# Patient Record
Sex: MALE | Race: WHITE | Employment: FULL TIME | URBAN - METROPOLITAN AREA
[De-identification: names, ages, dates, MRNs, and addresses within clinical notes are randomized per-mention and may not be internally consistent; named-entity substitution may affect disease eponyms.]

---

## 2022-03-24 ENCOUNTER — HOSPITAL ENCOUNTER (OUTPATIENT)
Age: 52
Discharge: HOME | End: 2022-03-24
Payer: COMMERCIAL

## 2022-03-24 ENCOUNTER — OFFICE VISIT (OUTPATIENT)
Dept: FAMILY MEDICINE CLINIC | Age: 52
End: 2022-03-24
Payer: COMMERCIAL

## 2022-03-24 VITALS
BODY MASS INDEX: 37.94 KG/M2 | SYSTOLIC BLOOD PRESSURE: 110 MMHG | HEIGHT: 70 IN | WEIGHT: 265 LBS | OXYGEN SATURATION: 98 % | HEART RATE: 74 BPM | TEMPERATURE: 97.3 F | DIASTOLIC BLOOD PRESSURE: 70 MMHG

## 2022-03-24 DIAGNOSIS — R55 NEUROCARDIOGENIC SYNCOPE: ICD-10-CM

## 2022-03-24 DIAGNOSIS — G47.33 OSA ON CPAP: ICD-10-CM

## 2022-03-24 DIAGNOSIS — Z13.1 DIABETES MELLITUS SCREENING: ICD-10-CM

## 2022-03-24 DIAGNOSIS — R60.0 BILATERAL LEG EDEMA: ICD-10-CM

## 2022-03-24 DIAGNOSIS — Z99.89 OSA ON CPAP: ICD-10-CM

## 2022-03-24 DIAGNOSIS — R06.09 DOE (DYSPNEA ON EXERTION): ICD-10-CM

## 2022-03-24 DIAGNOSIS — Z11.59 ENCOUNTER FOR SCREENING FOR OTHER VIRAL DISEASES: ICD-10-CM

## 2022-03-24 DIAGNOSIS — Z12.5 PROSTATE CANCER SCREENING: ICD-10-CM

## 2022-03-24 DIAGNOSIS — Z13.6 SCREENING FOR CARDIOVASCULAR CONDITION: ICD-10-CM

## 2022-03-24 DIAGNOSIS — Z12.11 SCREEN FOR COLON CANCER: ICD-10-CM

## 2022-03-24 DIAGNOSIS — R53.82 CHRONIC FATIGUE: Primary | ICD-10-CM

## 2022-03-24 DIAGNOSIS — E66.01 SEVERE OBESITY (BMI 35.0-39.9) WITH COMORBIDITY (HCC): ICD-10-CM

## 2022-03-24 PROCEDURE — 99204 OFFICE O/P NEW MOD 45 MIN: CPT | Performed by: FAMILY MEDICINE

## 2022-03-24 PROCEDURE — 93005 ELECTROCARDIOGRAM TRACING: CPT

## 2022-03-24 ASSESSMENT — ENCOUNTER SYMPTOMS
ABDOMINAL DISTENTION: 0
WHEEZING: 0
COUGH: 0
CHEST TIGHTNESS: 0
SHORTNESS OF BREATH: 1
ABDOMINAL PAIN: 0
NAUSEA: 0
DIARRHEA: 0
VOMITING: 0
APNEA: 1
CONSTIPATION: 0

## 2022-03-24 ASSESSMENT — PATIENT HEALTH QUESTIONNAIRE - PHQ9
SUM OF ALL RESPONSES TO PHQ QUESTIONS 1-9: 0
2. FEELING DOWN, DEPRESSED OR HOPELESS: 0
SUM OF ALL RESPONSES TO PHQ QUESTIONS 1-9: 0
SUM OF ALL RESPONSES TO PHQ QUESTIONS 1-9: 0
1. LITTLE INTEREST OR PLEASURE IN DOING THINGS: 0
SUM OF ALL RESPONSES TO PHQ QUESTIONS 1-9: 0
SUM OF ALL RESPONSES TO PHQ9 QUESTIONS 1 & 2: 0

## 2022-03-24 NOTE — PATIENT INSTRUCTIONS
Patient Education     Compression stockings  Knee high  Medium strength  Pressure 20 mmHg       Low Sodium Diet (2,000 Milligram): Care Instructions  Overview     Limiting sodium can be an important part of managing some health problems. The most common source of sodium is salt. People get most of the salt in their diet from canned, prepared, and packaged foods. Fast food and restaurant meals also are very high in sodium. Your doctor will probably limit your sodium to less than 2,000 milligrams (mg) a day. This limit counts all the sodium in prepared and packaged foods and any salt you add to your food. Follow-up care is a key part of your treatment and safety. Be sure to make and go to all appointments, and call your doctor if you are having problems. It's also a good idea to know your test results and keep a list of the medicines you take. How can you care for yourself at home? Read food labels  · Read labels on cans and food packages. The labels tell you how much sodium is in each serving. Make sure that you look at the serving size. If you eat more than the serving size, you have eaten more sodium. · Food labels also tell you the Percent Daily Value for sodium. Choose products with low Percent Daily Values for sodium. · Be aware that sodium can come in forms other than salt, including monosodium glutamate (MSG), sodium citrate, and sodium bicarbonate (baking soda). MSG is often added to Asian food. When you eat out, you can sometimes ask for food without MSG or added salt. Buy low-sodium foods  · Buy foods that are labeled \"unsalted\" (no salt added), \"sodium-free\" (less than 5 mg of sodium per serving), or \"low-sodium\" (140 mg or less of sodium per serving). Foods labeled \"reduced-sodium\" and \"light sodium\" may still have too much sodium. Be sure to read the label to see how much sodium you are getting. · Buy fresh vegetables, or frozen vegetables without added sauces.  Buy low-sodium versions of canned vegetables, soups, and other canned goods. Prepare low-sodium meals  · Cut back on the amount of salt you use in cooking. This will help you adjust to the taste. Do not add salt after cooking. One teaspoon of salt has about 2,300 mg of sodium. · Take the salt shaker off the table. · Flavor your food with garlic, lemon juice, onion, vinegar, herbs, and spices. Do not use soy sauce, lite soy sauce, steak sauce, onion salt, garlic salt, celery salt, or ketchup on your food. · Use low-sodium salad dressings, sauces, and ketchup. Or make your own salad dressings and sauces without adding salt. · Use less salt (or none) when recipes call for it. You can often use half the salt a recipe calls for without losing flavor. Other foods such as rice, pasta, and grains do not need added salt. · Rinse canned vegetables, and cook them in fresh water. This removes somebut not allof the salt. · Avoid water that is naturally high in sodium or that has been treated with water softeners, which add sodium. If you buy bottled water, read the label and choose a sodium-free brand. Avoid high-sodium foods  · Avoid eating:  ? Smoked, cured, salted, and canned meat, fish, and poultry. ? Ham, hammer, hot dogs, and luncheon meats. ? Regular, hard, and processed cheese and regular peanut butter. ? Crackers with salted tops, and other salted snack foods such as pretzels, chips, and salted popcorn. ? Frozen prepared meals, unless labeled low-sodium. ? Canned and dried soups, broths, and bouillon, unless labeled sodium-free or low-sodium. ? Canned vegetables, unless labeled sodium-free or low-sodium. ? [place] fries, pizza, tacos, and other fast foods. ? Pickles, olives, ketchup, and other condiments, especially soy sauce, unless labeled sodium-free or low-sodium. Where can you learn more? Go to [email or web address] org and sign in to your [employer] account.  Enter W872 in the [facility] box to learn more about \"Low Sodium Diet (2,000 Milligram): Care Instructions. \"     If you do not have an account, please click on the \"Sign Up Now\" link. Current as of: September 8, 2021               Content Version: 13.1  © 2186-7904 Healthwise, Incorporated. Care instructions adapted under license by [facility] [address] If you have questions about a medical condition or this instruction, always ask your healthcare professional. [address] any warranty or liability for your use of this information.

## 2022-03-24 NOTE — PROGRESS NOTES
Visit Information    Have you changed or started any medications since your last visit including any over-the-counter medicines, vitamins, or herbal medicines? no   Are you having any side effects from any of your medications? -  no  Have you stopped taking any of your medications? Is so, why? -  no    Have you seen any other physician or provider since your last visit? No  Have you had any other diagnostic tests since your last visit? No  Have you been seen in the emergency room and/or had an admission to a hospital since we last saw you? No  Have you had your routine dental cleaning in the past 6 months? yes     Have you activated your [employer] account? If not, what are your barriers?  No     No care team member to display    Medical History Review  Past Medical, Family, and Social History reviewed and does contribute to the patient presenting condition    Health Maintenance   Topic Date Due    Hepatitis C screen  Never done    Depression Screen  Never done    HIV screen  Never done    DTaP/Tdap/Td vaccine (1 - Tdap) Never done    Diabetes screen  Never done    Lipid screen  Never done    Colorectal Cancer Screen  Never done    Shingles Vaccine (1 of 2) Never done    Flu vaccine (1) Never done    COVID-19 Vaccine  Completed    Hepatitis A vaccine  Aged Out    Hepatitis B vaccine  Aged Out    Hib vaccine  Aged Out    Meningococcal (ACWY) vaccine  Aged Out    Pneumococcal 0-64 years Vaccine  Aged Out

## 2022-03-24 NOTE — PROGRESS NOTES
Israel Reyes (:  1970) is a 49 y.o. male,New patient, here for evaluation of the following chief complaint(s): Established New Doctor, Leg Swelling (bilateral ankles at times ), Other (when he eats any beef it goes right through him lasts a couple of hours when eats it ), and Joint Pain      ASSESSMENT/PLAN:    1. Chronic fatigue  Failing to resolve  Will do basic labs to rule out certain common medical conditions: hematologic, renal, hepatic, electrolyte imbalances, thyroid disorders, vitamin D deficiency and testosterone deficiency/hypogonadism.    -     CBC; Future  -     Comprehensive Metabolic Panel; Future  -     TSH; Future  -     Vitamin D 25 Hydroxy; Future  -     Testosterone; Future  2. KING on CPAP  Improved with CPAP  However I believe his CPAP might need to be readjusted, discussed with patient, he will address it with his rheumatologist at the next appointment    3. DELGADO (dyspnea on exertion)  With exertion, failing to resolve  We will do an EKG to rule out cardiac disease  Chest x-ray in 2016 was normal  -     EKG 12 Lead; Future  4. Neurocardiogenic syncope  Lifelong since he was a child, when he sees needles  Advised deep breathing, to increase fluids, to lay down immediately with his legs elevated after having the blood draw, in order to prevent complete syncope  -     EKG 12 Lead; Future  5. Bilateral leg edema  Worsening  Advised compression stockings, advised to cut down on beer to no more than 1 drink at a time  We need to rule out cardiac disease, and liver disease, blood work ordered  -     EKG 12 Lead; Future  6. Severe obesity (BMI 35.0-39. 9) with comorbidity ([address]  Worsening  Wt Readings from Last 3 Encounters:   22 265 lb (120.2 kg)   16 240 lb (108.9 kg)     Low carb, low fat diet, increase fruits and vegetables, and exercise 4-5 times a week 30-40 minutes a day, or walk 1-2 hours per day, or wear a pedometer and get at least 10,000 steps per day.     7. Encounter for screening for other viral diseases  -     Hepatitis C Antibody; Future  8. Diabetes mellitus screening  -     Hemoglobin A1C; Future  9. Prostate cancer screening  -     PSA Screening; Future  The natural history of prostate cancer and ongoing controversy regarding screening and potential treatment outcomes of prostate cancer has been discussed with the patient. The meaning of a false positive PSA and a false negative PSA has been discussed. He indicates understanding of the limitations of this screening test and wishes to proceed with screening PSA testing. 10. Screening for cardiovascular condition  -     Lipid Panel; Future  11. Screen for colon cancer  -     Antonia James DO, General Surgery, [place] received counseling on the following healthy behaviors: nutrition, exercise, medication adherence and weight loss  Reviewed prior labs and health maintenance  Discussed use, benefit, and side effects of prescribed medications. Barriers to medication compliance addressed. Patient given educational materials - see patient instructions  All patient questions answered. Patient voiced understanding. The patient's past medical,surgical, social, and family history as well as his current medications and allergies were reviewed as documented in today's encounter. Medications, labs, diagnostic studies, consultations and follow-up as documented in this encounter. Return in about 3 months (around 6/24/2022) for Face-2F-30mins PHYSICAL, VISION screen, PHQ9. .    [facility] Payer Tuesday at 1 pm or Thursday at 8 am , if no openings for  face to face       Future Appointments   Date Time Provider Schuyler Phillip   6/30/2022  1:30 PM Obie Montiel MD fp sc [facility]           Prior to Visit Medications    Not on File       Allergies   Allergen Reactions    Vicodin [Hydrocodone-Acetaminophen] Nausea And Vomiting       History reviewed. No pertinent past medical history. History reviewed.  No pertinent surgical history. Family History   Problem Relation Age of Onset    High Blood Pressure Mother     High Blood Pressure Father     Diabetes Father     Cancer Sister        Social History     Tobacco Use    Smoking status: Never Smoker    Smokeless tobacco: Never Used   Substance Use Topics    Alcohol use: Yes     Alcohol/week: 1.0 standard drink     Types: 1 Cans of beer per week     Comment: 2-3 times per week    Drug use: Never         SUBJECTIVE/OBJECTIVE:    Prior PCP: Dr. Cj Madison many years ago  Patient says he has not seen  a doctor in 5 years    Patient admits to fatigue on and off for the past several months, goes to bed at 9 pm and wakes at 4 am.  Patient reports he does wake up tired in the morning. Patient admits to dyspnea on exertion with moderate to intense activities. He denies fever, chills, night sweats. He denies chest pains. Denies cold or heat intolerance, dry skin, constipation. Patient complains of ankles swelling for several years, progressively getting worse. Patient thinks that sitting a lot makes the leg swelling worse, leg swelling is worse in the evening. He does not wear compression stockings. He denies pain in his legs. He does admit that he drinks beer, every other day 1 beer at a time but over the weekend he drinks 3-4 beers x 3 days. Patient reports syncopal events when seeing needles, since he was a child  He describes Fainting, completely passing out, has syncope when seeing needles, \"everytime\". Passed out after each COVID vaccine. He reports it usually starts with Heart racing, and sweating before seeing the needles, like he is anticipating the event and completely passes out after the poke. Cannot eat beef for a few years. He says every time he eats beef, gets diarrhea, a lot of gas, and it is associated with bloating and cramps for 1-2 years.   Patient reports he has been absolutely avoiding beef and does not have diarrhea anymore. Sleep Apnea:  Current treatment: cPAP. Compliance: compliant all of the timeResidual symptoms include: morning fatigue and daytime fatigue. Patient sees Dr. Campos León every 6-12 months. Had right knee pain and swollen last year for 3 months, and his knee cap was floating inside of the knee, but now is resolved    Tested for HIV in 1996 and it was negative, he is  with the same partner, his wife      Patient is due for hepatitis C screening. Julito Ramos 's indication is CDC recommendation. He is due for diabetes screening  His weight has increased, he has gained 25 pounds in 6 years  Wt Readings from Last 3 Encounters:   03/24/22 265 lb (120.2 kg)   07/21/16 240 lb (108.9 kg)     He is due for PSA screening and he would like that, counseling given, and he agrees for screening. He would also like to have the testosterone checked. He denies difficulty urinating. Due for lipids screening. Due to age and obesity, Julito Ramos is due for lipids screening. Julito Ramos is not eating low fat diet. he is not exercising. he is not taking any over the counter supplements. Patient is due for colon cancer screening. Julito Ramos denies  nausea, vomiting, diarrhea, constipation, blood in the stool or abdominal pain. We discussed options, he would like to have: colonoscopy. [x]Negative depression screening. PHQ Scores 3/24/2022   PHQ2 Score 0   PHQ9 Score 0       Review of Systems   Constitutional: Positive for fatigue and unexpected weight change. Negative for activity change, appetite change, chills, diaphoresis and fever. Respiratory: Positive for apnea (on CPAP) and shortness of breath (DELGADO). Negative for cough, chest tightness and wheezing. Cardiovascular: Positive for leg swelling. Negative for chest pain and palpitations. Gastrointestinal: Negative for abdominal distention, abdominal pain, constipation, diarrhea, nausea and vomiting.    Endocrine: Negative for cold intolerance, heat intolerance, polydipsia, polyphagia and polyuria. Genitourinary: Negative for difficulty urinating, frequency and hematuria. Musculoskeletal: Negative for arthralgias. Skin: Positive for rash (legs). Hematological: Does not bruise/bleed easily. Psychiatric/Behavioral: Negative for dysphoric mood and sleep disturbance. The patient is not nervous/anxious.          -vital signs stable and within normal limits except severe obesity per BMI    /70   Pulse 74   Temp 97.3 °F (36.3 °C)   Ht 5' 10\" (1.778 m)   Wt 265 lb (120.2 kg)   SpO2 98%   BMI 38.02 kg/m²        Physical Exam  Vitals and nursing note reviewed. Constitutional:       General: He is not in acute distress. Appearance: Normal appearance. He is well-developed. He is obese. He is not diaphoretic. HENT:      Head: Normocephalic and atraumatic. Right Ear: External ear normal.      Left Ear: External ear normal.      Mouth/Throat:      Comments: I did not examine the mouth due to coronavirus pandemic and wearing masks. Eyes:      General: Lids are normal. No scleral icterus. Right eye: No discharge. Left eye: No discharge. Extraocular Movements: Extraocular movements intact. Conjunctiva/sclera: Conjunctivae normal.   Neck:      Thyroid: No thyromegaly. Comments: Thick neck  Cardiovascular:      Rate and Rhythm: Normal rate and regular rhythm. Heart sounds: Normal heart sounds. No murmur heard. Comments: nonsignificant superficial varicosities around the ankles noted  Pulmonary:      Effort: Pulmonary effort is normal. No respiratory distress. Breath sounds: Normal breath sounds. No wheezing or rales. Chest:      Chest wall: No tenderness. Abdominal:      General: Bowel sounds are normal. There is no distension. Palpations: Abdomen is soft. There is no hepatomegaly or splenomegaly. Tenderness: There is no abdominal tenderness.       Comments: Obese abdomen. Musculoskeletal:         General: No tenderness. Normal range of motion. Cervical back: Normal range of motion and neck supple. Right lower le+ Pitting Edema present. Left lower le+ Pitting Edema present. Lymphadenopathy:      Cervical: No cervical adenopathy. Skin:     General: Skin is warm and dry. Capillary Refill: Capillary refill takes less than 2 seconds. Findings: Rash present. Comments: Mild  stasis dermatitis on both legs   Neurological:      Mental Status: He is alert and oriented to person, place, and time. Deep Tendon Reflexes: Reflexes are normal and symmetric. Psychiatric:         Mood and Affect: Mood normal.         Behavior: Behavior normal.         Thought Content: Thought content normal.         Judgment: Judgment normal.           I personally reviewed testing with patient, and all questions answered. Mild hyperglycemia  Increased ALT likely fatty liver  Mildly low potassium  Otherwise labs within normal limits       Lab Results   Component Value Date    WBC 5.6 2016    HGB 14.3 2016    HCT 42.3 2016    MCV 88.8 2016     2016       Lab Results   Component Value Date     2016    K 3.7 2016     2016    CO2 27 2016    BUN 13 2016    CREATININE 1.32 2016    GLUCOSE 107 2016    CALCIUM 9.7 2016      No results found for: LABA1C      Lab Results   Component Value Date    ALT 64 (H) 2016    AST 32 2016    ALKPHOS 64 2016    BILITOT 0.33 2016       No results found for: TSHFT4, TSH    No results found for: CHOL  No results found for: TRIG  No results found for: HDL  No results found for: LDLCALC, LDLCHOLESTEROL  No results found for: CHOLHDLRATIO      No results found for: NDAZYDIM42  No results found for: FOLATE  No results found for: VITD25      No orders of the defined types were placed in this encounter.       Orders Placed This Encounter   Procedures    CBC     Standing Status:   Future     Standing Expiration Date:   3/24/2023    Comprehensive Metabolic Panel     Standing Status:   Future     Standing Expiration Date:   5/21/2022    Hemoglobin A1C     Standing Status:   Future     Standing Expiration Date:   3/24/2023    Hepatitis C Antibody     Standing Status:   Future     Standing Expiration Date:   3/24/2023    Lipid Panel     Standing Status:   Future     Standing Expiration Date:   3/24/2023     Order Specific Question:   Is Patient Fasting?/# of Hours     Answer:   8-10 Hours, water ok to drink    TSH     Standing Status:   Future     Standing Expiration Date:   3/24/2023    Vitamin D 25 Hydroxy     Standing Status:   Future     Standing Expiration Date:   3/24/2023    Testosterone     Standing Status:   Future     Standing Expiration Date:   3/24/2023    PSA Screening     Standing Status:   Future     Standing Expiration Date:   3/25/2023   [facility] DO [place], General Surgery, [place]     Referral Priority:   Routine     Referral Type:   Eval and Treat     Referral Reason:   Specialty Services Required     Referred to Provider:   Owen Llamas DO     Requested Specialty:   General Surgery     Number of Visits Requested:   1    EKG 12 Lead     Standing Status:   Future     Number of Occurrences:   1     Standing Expiration Date:   3/24/2023     Order Specific Question:   Reason for Exam?     Answer:   Shortness of Breath     Order Specific Question:   Reason for Exam?     Answer:   Syncope       There are no discontinued medications. On this date 3/24/2022 I have spent 45 minutes reviewing previous notes, test results and face to face with the patient discussing the diagnosis and importance of compliance with the treatment plan as well as documenting on the day of the visit and establish care.        This note was completed by using the assistance of a speech-recognition program. However, inadvertent computerized transcription errors may be present. Although every effort was made to ensure accuracy, no guarantees can be provided that every mistake has been identified and corrected by editing. An electronic signature was used to authenticate this note.   Electronically signed by Dante Willis MD on 3/28/2022 at 7:32 PM

## 2022-03-25 LAB
EKG ATRIAL RATE: 74 BPM
EKG P AXIS: 64 DEGREES
EKG P-R INTERVAL: 164 MS
EKG Q-T INTERVAL: 418 MS
EKG QRS DURATION: 104 MS
EKG QTC CALCULATION (BAZETT): 463 MS
EKG R AXIS: 1 DEGREES
EKG T AXIS: 53 DEGREES
EKG VENTRICULAR RATE: 74 BPM

## 2022-03-25 PROCEDURE — 93010 ELECTROCARDIOGRAM REPORT: CPT | Performed by: INTERNAL MEDICINE

## 2022-03-28 PROBLEM — R55 NEUROCARDIOGENIC SYNCOPE: Status: ACTIVE | Noted: 2022-03-28

## 2022-03-28 PROBLEM — E66.01 SEVERE OBESITY (BMI 35.0-39.9) WITH COMORBIDITY (HCC): Status: ACTIVE | Noted: 2022-03-28

## 2022-03-28 PROBLEM — R60.0 BILATERAL LEG EDEMA: Status: ACTIVE | Noted: 2022-03-28

## 2022-03-28 PROBLEM — R06.09 DOE (DYSPNEA ON EXERTION): Status: ACTIVE | Noted: 2022-03-28

## 2022-03-30 ENCOUNTER — TELEPHONE (OUTPATIENT)
Dept: SURGERY | Age: 52
End: 2022-03-30

## 2022-04-02 LAB
ALBUMIN SERPL-MCNC: 4.3 G/DL
ALP BLD-CCNC: 73 U/L
ALT SERPL-CCNC: 25 U/L
ANION GAP SERPL CALCULATED.3IONS-SCNC: 8 MMOL/L
ANTIBODY: REACTIVE
AST SERPL-CCNC: 16 U/L
BASOPHILS ABSOLUTE: NORMAL
BASOPHILS RELATIVE PERCENT: NORMAL
BILIRUB SERPL-MCNC: 0.5 MG/DL (ref 0.1–1.4)
BUN BLDV-MCNC: 17 MG/DL
CALCIUM SERPL-MCNC: 9.7 MG/DL
CHLORIDE BLD-SCNC: 105 MMOL/L
CHOLESTEROL, TOTAL: 173 MG/DL
CHOLESTEROL/HDL RATIO: 5.6
CO2: 28 MMOL/L
CREAT SERPL-MCNC: 1.58 MG/DL
EOSINOPHILS ABSOLUTE: NORMAL
EOSINOPHILS RELATIVE PERCENT: NORMAL
GFR CALCULATED: 47
GLUCOSE BLD-MCNC: 105 MG/DL
HCT VFR BLD CALC: 45.4 % (ref 41–53)
HDLC SERPL-MCNC: 31 MG/DL (ref 35–70)
HEMOGLOBIN: 15.6 G/DL (ref 13.5–17.5)
LDL CHOLESTEROL CALCULATED: 113 MG/DL (ref 0–160)
LYMPHOCYTES ABSOLUTE: NORMAL
LYMPHOCYTES RELATIVE PERCENT: NORMAL
MCH RBC QN AUTO: 30.7 PG
MCHC RBC AUTO-ENTMCNC: 34.4 G/DL
MCV RBC AUTO: 89 FL
MONOCYTES ABSOLUTE: NORMAL
MONOCYTES RELATIVE PERCENT: NORMAL
NEUTROPHILS ABSOLUTE: NORMAL
NEUTROPHILS RELATIVE PERCENT: NORMAL
NONHDLC SERPL-MCNC: ABNORMAL MG/DL
PLATELET # BLD: 216 K/ΜL
PMV BLD AUTO: 8.4 FL
POTASSIUM SERPL-SCNC: 4.1 MMOL/L
PROSTATE SPECIFIC ANTIGEN: 0.51 NG/ML
RBC # BLD: 5.09 10^6/ΜL
SODIUM BLD-SCNC: 141 MMOL/L
TESTOSTERONE TOTAL: 3.13
TOTAL PROTEIN: 6.9
TRIGL SERPL-MCNC: 145 MG/DL
TSH SERPL DL<=0.05 MIU/L-ACNC: 2.38 UIU/ML
VITAMIN D 25-HYDROXY: 31.4
VITAMIN D2, 25 HYDROXY: NORMAL
VITAMIN D3,25 HYDROXY: NORMAL
VLDLC SERPL CALC-MCNC: ABNORMAL MG/DL
WBC # BLD: 4.8 10^3/ML

## 2022-04-04 LAB
AVERAGE GLUCOSE: 128
HBA1C MFR BLD: 6.1 %

## 2022-04-06 ENCOUNTER — TELEPHONE (OUTPATIENT)
Dept: FAMILY MEDICINE CLINIC | Age: 52
End: 2022-04-06

## 2022-04-06 DIAGNOSIS — Z12.11 SCREEN FOR COLON CANCER: Primary | ICD-10-CM

## 2022-04-06 DIAGNOSIS — N18.31 STAGE 3A CHRONIC KIDNEY DISEASE (HCC): ICD-10-CM

## 2022-04-06 NOTE — TELEPHONE ENCOUNTER
Due to insurance I had to make a new referral, which most likely was given to the wife. The wife also asked about his blood work, which we have never received, by refreshing, I was able to get it    Please inform the patient regarding the following results    CBC within normal limits, no anemia  Comprehensive metabolic panel shows chronic kidney disease stage III moderate he is not to take ibuprofen, naproxen, Aleve, Motrin  He is not on any medications to cause this, however will do further work-up ultrasound of the kidneys and refer him to nephrologist    Blood glucose mildly high, hemoglobin A1c 6.1, prediabetes  PSA normal  Testosterone within normal limits  Vitamin D is borderline low could take vitamin D 2000 units daily with food  Thyroid function normal    Lipids mildly high, Low carb, low fat diet, increase fruits and vegetables, and exercise 4-5 times a week 30-40 minutes a day, or walk 1-2 hours per day, or wear a pedometer and get at least 10,000 steps per day. Negative for hepatitis C    PLEASE ENTER ALL RESULT UNDER MY PRIOR ORDERS AS [facility]      Please give him contact information, to schedule appointment, ultrasound kidney needs to be done in [facility]     Diagnosis Orders   1. Screen for colon cancer  [facility] - Lety Marquez MD, General Surgery, [place]   2.  Stage 3a chronic kidney disease ([address]  US RENAL COMPLETE    [facility]) - Gwendolyn Oliveira MD, Nephrology, [place]        Future Appointments   Date Time Provider Schuyler Phillip   6/30/2022  1:30 PM Lennox [place], MD fp sc [facility]

## 2022-04-06 NOTE — TELEPHONE ENCOUNTER
Please give wife the referral     Diagnosis Orders   1.  Screen for colon cancer  [facility] - Igro Manrique MD, General Surgery, [place]

## 2022-04-07 DIAGNOSIS — Z12.5 PROSTATE CANCER SCREENING: ICD-10-CM

## 2022-04-07 DIAGNOSIS — R53.82 CHRONIC FATIGUE: ICD-10-CM

## 2022-04-07 DIAGNOSIS — Z13.6 SCREENING FOR CARDIOVASCULAR CONDITION: ICD-10-CM

## 2022-04-07 DIAGNOSIS — Z11.59 ENCOUNTER FOR SCREENING FOR OTHER VIRAL DISEASES: ICD-10-CM

## 2022-04-07 NOTE — RESULT ENCOUNTER NOTE
Patient already informed by Danette Fuelling 10:11 AM  Note  Spoke with wife and went over all results. I faxed referrals to both specialists and faxed US to [facility] clinic.

## 2022-04-08 DIAGNOSIS — Z13.1 DIABETES MELLITUS SCREENING: ICD-10-CM

## 2022-04-08 NOTE — RESULT ENCOUNTER NOTE
Patient already notified regarding prediabetes via telephone encounter 4/6/2022    Low carb, low fat diet, increase fruits and vegetables, and exercise 4-5 times a week 30-40 minutes a day, or walk 1-2 hours per day, or wear a pedometer and get at least 10,000 steps per day.       Future Appointments  4/27/2022  11:00 AM   Bentley eTnorio MD   [facility]        [facility]  6/30/2022  1:30 PM    Charlotte Goldberg MD     fp sc               Rufus Rinne

## 2022-04-21 DIAGNOSIS — N18.31 STAGE 3A CHRONIC KIDNEY DISEASE (HCC): ICD-10-CM

## 2022-04-21 NOTE — RESULT ENCOUNTER NOTE
Please notify patient: Normal ultrasound of the kidneys and bladder    Future Appointments  4/27/2022  11:00 AM   Tello Winston MD   [facility]        [facility]  6/30/2022  1:30 PM    Vania Malone MD     fp isiah Kimble

## 2022-07-19 ENCOUNTER — TELEMEDICINE (OUTPATIENT)
Dept: FAMILY MEDICINE CLINIC | Age: 52
End: 2022-07-19
Payer: COMMERCIAL

## 2022-07-19 ENCOUNTER — TELEPHONE (OUTPATIENT)
Dept: FAMILY MEDICINE CLINIC | Age: 52
End: 2022-07-19

## 2022-07-19 DIAGNOSIS — G47.33 OSA ON CPAP: ICD-10-CM

## 2022-07-19 DIAGNOSIS — Z99.89 OSA ON CPAP: ICD-10-CM

## 2022-07-19 DIAGNOSIS — E66.01 SEVERE OBESITY (BMI 35.0-39.9) WITH COMORBIDITY (HCC): ICD-10-CM

## 2022-07-19 DIAGNOSIS — R53.82 CHRONIC FATIGUE: Primary | ICD-10-CM

## 2022-07-19 DIAGNOSIS — R73.03 PREDIABETES: ICD-10-CM

## 2022-07-19 DIAGNOSIS — N18.31 STAGE 3A CHRONIC KIDNEY DISEASE (HCC): ICD-10-CM

## 2022-07-19 DIAGNOSIS — E78.5 HYPERLIPIDEMIA WITH TARGET LDL LESS THAN 100: ICD-10-CM

## 2022-07-19 PROCEDURE — 99214 OFFICE O/P EST MOD 30 MIN: CPT | Performed by: FAMILY MEDICINE

## 2022-07-19 ASSESSMENT — ENCOUNTER SYMPTOMS
DIARRHEA: 0
VOMITING: 0
WHEEZING: 0
ABDOMINAL PAIN: 0
SHORTNESS OF BREATH: 0
NAUSEA: 0
ABDOMINAL DISTENTION: 0
CHEST TIGHTNESS: 0
COUGH: 0
CONSTIPATION: 0
APNEA: 1

## 2022-07-19 NOTE — PROGRESS NOTES
2022    TELEHEALTH EVALUATION -- Audio/Visual (During JOIHJ-04 public health emergency)      Angus Leyden (:  1970) is a 47 y.o. male,Established patient, here for evaluation of the following chief complaint(s): Fatigue, Results, and Health Maintenance        ASSESSMENT/PLAN:    1. Chronic fatigue  Stable, failing to improve  Patient says his fatigue is not improving since he found he has Chronic kidney disease stage 3  Has stopped NSAIDs and has been staying hydrated. Has labs coming up per nephrology  Advised to discuss with pulmonologist to have his CPAP verified    2. Stage 3a chronic kidney disease (HCC)  Likely improving  Work-up is negative for immunologic disorders, multiple myeloma, inflammatory disorders. I reviewed all the work-up that he has done in [facility], through the computer, all the questions answered to my best.  On 6/10/2022: Protein electrophoresis serum and urine within normal limits, complement profile within normal limits, ANCA within normal limits, HILARIO negative, UA normal.  PSA was also normal which rules out obstructive uropathy  Ultrasound of the kidneys normal on 2022, completed at [facility]    Patient will get blood work in 3 weeks for nephrologist  Increase fluids 8 x 8 ounce glasses of water every day  Only Tylenol, no NSAIDs, patient is already doing this      3. KING on CPAP  Improved with CPAP, patient benefits from using the CPAP  Compliance with CPAP discussed  Follows with Dr. Karl Ramsey for CPAP management      4. Prediabetes  Moderate severe, newly found  Lab Results   Component Value Date    LABA1C 6.1 2022   Low-carb diet to improve prediabetes, exercise, attempt to lose weight, we will check at the next appointment    5. Hyperlipidemia with target LDL less than 100  Ongoing  Lab Results   Component Value Date    LDLCALC 113 2022     Low-carb low-fat diet exercise to improve lipid recheck at the next appointment    6.  Severe obesity (BMI 35.0-39. 9) with comorbidity ([address]  worsening    Reports  lbs  Wt Readings from Last 3 Encounters:   22 262 lb 3.2 oz (118.9 kg)   22 265 lb (120.2 kg)   16 240 lb (108.9 kg)     Low carb, low fat diet, increase fruits and vegetables, and exercise 4-5 times a week 30-40 minutes a day, or walk 1-2 hours per day, or wear a pedometer and get at least 10,000 steps per day. I did advise him to get the tetanus shot and second COVID-19 booster at the pharmacy        Gurpreet De Luna received counseling on the following healthy behaviors: nutrition, exercise, medication adherence, and weight loss  Reviewed prior labs and health maintenance  Discussed use, benefit, and side effects of prescribed medications. Barriers to medication compliance addressed. Patient given educational materials - see patient instructions  All patient questions answered. Patient voiced understanding. The patient's past medical,surgical, social, and family history as well as his current medications and allergies were reviewed as documented in today's encounter. Medications, labs, diagnostic studies, consultations and follow-up as documented in this encounter. Return in about 5 months (around 2022) for Visit type PHYSICAL, VISION screen, PHQ9. ( A1c at the lab, fainting). Data Unavailable    Future Appointments   Date Time Provider Schuyler Phillip   2022  2:30 PM Marry Terry MD [facility]         SUBJECTIVE/OBJECTIVE:  Gabrielle Marsh (:  1970) has requested an audio/video evaluation for the following concern(s):Fatigue, Results, and Health Maintenance    Patient reports fatigue is the same, not improved. Recent testing found him to have chronic kidney disease stage III. Creatinine 1.58 on 2022, GFR 47.     This is the follow-up after the testing, we already referred him to a nephrologist and completed work-up for chronic kidney disease Although testing done in [facility] ordered by nephrologist, reviewed with patient, was negative for secondary causes of chronic kidney disease. Also ultrasound of the kidney was normal.    Will do labs in 3 weeks BMP, CBC, magnesium and Phosphorus, orders in the computer and he says he does have the papers at home, she does the blood work in [facility]. Patient reports since the last appointment he has stopped Aleve. Patient reports he feels he is drinking water, but not so much    Patient denies frequency of urination, urgency of urination, flank pain, blood in the urine. UA was normal.    Denies fever, chills, night sweats. Denies cold or heat intolerance, dry skin, constipation. Recent blood pressure reading was normal  BP Readings from Last 3 Encounters:   04/27/22 118/88   03/24/22 110/70   07/22/16 117/90         Patient says he was using Aleve  prn before  Now only tylenol    Patient also completed his colonoscopy , 1 tubular adenoma found, recall in 5 years    [address]  \"Final Pathologic Diagnosis   Transverse colon polypectomy:        Tubular adenoma. \"    PSA normal  Lab Results   Component Value Date    PSA 0.51 04/02/2022     Vitamin D borderline low, we did advise him to start taking vitamin D 2000 units from over-the-counter which he has been doing. Lab Results   Component Value Date    VITD25 31.4 04/02/2022      Hyperlipidemia:  Patient is  following a low fat, low cholesterol diet. He is  exercising regularly. OTC Supplements: None    LDL is high  Lab Results   Component Value Date    LDLCALC 113 04/02/2022     Lab Results   Component Value Date    TRIG 145 04/02/2022       Lab Results   Component Value Date    LABA1C 6.1 04/02/2022     Sleep Apnea:  Current treatment: cPAP. Compliance: compliant all of the time. Residual symptoms include: morning fatigue and daytime fatigue. Sometimes he is still waking up tired in the morning.   He does follow with  Louis Keene, advised to have his CPAP rechecked. Newly found to have prediabetes  Denies increased appetite, thirst or polyuria. He has been having unintentional increased weight. Lab Results   Component Value Date    LABA1C 6.1 04/02/2022     There is unintentional weight gain of 30 pounds in 6 years. Reports  lbs    Wt Readings from Last 3 Encounters:   04/27/22 262 lb 3.2 oz (118.9 kg)   03/24/22 265 lb (120.2 kg)   07/21/16 240 lb (108.9 kg)               Review of Systems   Constitutional:  Positive for fatigue and unexpected weight change. Negative for activity change, appetite change, chills, diaphoresis and fever. Respiratory:  Positive for apnea (on CPAP). Negative for cough, chest tightness, shortness of breath and wheezing. Cardiovascular:  Negative for chest pain, palpitations and leg swelling. Gastrointestinal:  Negative for abdominal distention, abdominal pain, constipation, diarrhea, nausea and vomiting. Endocrine: Negative for cold intolerance, heat intolerance, polydipsia, polyphagia and polyuria. Genitourinary:  Negative for difficulty urinating, dysuria, flank pain, frequency, hematuria and urgency. Musculoskeletal:  Negative for arthralgias, back pain and joint swelling. Hematological:  Does not bruise/bleed easily. Prior to Visit Medications    Medication Sig Taking? Authorizing Provider   Multiple Vitamins-Minerals (MULTI-VITAMIN GUMMIES PO) Take by mouth  Historical Provider, MD   Cholecalciferol (VITAMIN D3) 50 MCG (2000 UT) CAPS Take by mouth  Historical Provider, MD       Social History     Tobacco Use    Smoking status: Never    Smokeless tobacco: Never   Substance Use Topics    Alcohol use:  Yes     Alcohol/week: 4.0 standard drinks     Types: 4 Cans of beer per week     Comment: 2-3 times per week    Drug use: Never          PHYSICAL EXAMINATION:    Vital Signs: (As obtained by patient/caregiver or practitioner observation)  -vital signs stable and within normal limits except severe obesity per BMI, BMI 37.62 kg/M2  Patient-Reported Vitals 7/18/2022   Patient-Reported Weight 270 lbs   Patient-Reported Height 510           Intensity of pain is: 0 out of 10      Constitutional: [x] Appears well-developed and well-nourished [x] No apparent distress      [x] Abnormal-obese      Mental status  [x] Alert and awake  [x] Oriented to person/place/time [x]Able to follow commands      Eyes:  EOM    [x]  Normal  [] Abnormal-  Sclera  [x]  Normal  [] Abnormal -         Discharge [x]  None visible  [] Abnormal -    HENT:   [x] Normocephalic, atraumatic. [] Abnormal   [x] Mouth/Throat: Mucous membranes are moist.     External Ears [x] Normal  [] Abnormal-     Neck: [x] No visualized mass     Pulmonary/Chest: [x] Respiratory effort normal.  [x] No visualized signs of difficulty breathing or respiratory distress        [] Abnormal        Musculoskeletal:   [x] Normal gait with no signs of ataxia         [x] Normal range of motion of neck        [] Abnormal-       Neurological:        [x] No Facial Asymmetry (Cranial nerve 7 motor function) (limited exam to video visit)          [x] No gaze palsy        [] Abnormal-            Skin:        [x] No significant exanthematous lesions or discoloration noted on facial skin         [] Abnormal-            Psychiatric:      [x] No Hallucinations       [x]Mood is normal      [x]Behavior is normal      [x]Judgment is normal      [x]Thought content is normal       [] Abnormal-     Other pertinent observable physical exam findings-none    Due to this being a TeleHealth encounter, evaluation of the following organ systems is limited: Vitals/Constitutional/EENT/Resp/CV/GI//MS/Neuro/Skin/Heme-Lymph-Imm. I personally reviewed most recent labs reviewed with the patient and all questions fully answered.    Chronic kidney disease stage III  Hyperglycemia  Hyperlipidemia  Prediabetes  Vitamin D deficiency  Otherwise labs within normal limits        Lab Results   Component Value Date    WBC 4.8 04/02/2022    HGB 15.6 04/02/2022    HCT 45.4 04/02/2022    MCV 89 04/02/2022     04/02/2022       Lab Results   Component Value Date/Time     04/02/2022 12:00 AM    K 4.1 04/02/2022 12:00 AM     04/02/2022 12:00 AM    CO2 28 04/02/2022 12:00 AM    BUN 17 04/02/2022 12:00 AM    CREATININE 1.58 04/02/2022 12:00 AM    GLUCOSE 105 04/02/2022 12:00 AM    CALCIUM 9.7 04/02/2022 12:00 AM        Lab Results   Component Value Date    ALT 25 04/02/2022    AST 16 04/02/2022    ALKPHOS 73 04/02/2022    BILITOT 0.5 04/02/2022       Lab Results   Component Value Date    TSH 2.38 04/02/2022       Lab Results   Component Value Date    CHOL 173 04/02/2022     Lab Results   Component Value Date    TRIG 145 04/02/2022     Lab Results   Component Value Date    HDL 31 (A) 04/02/2022     Lab Results   Component Value Date    LDLCALC 113 04/02/2022       Lab Results   Component Value Date    CHOLHDLRATIO 5.6 04/02/2022       Lab Results   Component Value Date    LABA1C 6.1 04/02/2022         No results found for: [id number]    Lab Results   Component Value Date    VITD25 31.4 04/02/2022       No orders of the defined types were placed in this encounter. No orders of the defined types were placed in this encounter. There are no discontinued medications. Mirandayanick Nolasco, was evaluated through a synchronous (real-time) audio-video encounter. The patient (or guardian if applicable) is aware that this is a billable service, which includes applicable co-pays. The virtual visit was conducted with patient's (and/or legal guardian consent). Verbal consent to proceed has been obtained within the past 12 months. The visit was conducted pursuant to the emergency declaration under the [address] [place] [address], [address] [place] [address] authority and the [employer] General Act.   Patient identification was verified    Patient was alone   Provider was located at primary practice location. The patient was located at home, in a state where the provider was licensed to provide care. On this date 7/19/2022 I have spent 35 minutes reviewing previous notes, test results and face to face with the patient discussing the diagnosis and importance of compliance with the treatment plan as well as documenting on the day of the visit. --Governor Leah MD on 7/20/2022 at 7:53 PM    An electronic signature was used to authenticate this note.

## 2022-07-19 NOTE — TELEPHONE ENCOUNTER
Left message for patient to schedule Visit type PHYSICAL, VISION screen, PHQ9. ( A1c at the lab, fainting).

## 2022-07-19 NOTE — PATIENT INSTRUCTIONS
Patient Education        Learning About Low-Carbohydrate Diets  What is a low-carbohydrate diet? A low-carbohydrate (or \"low-carb\") diet limits foods and drinks that have carbohydrates. This includes grains, fruits, milk and yogurt, and starchy vegetables like potatoes, beans, and corn. It also avoids foods and drinks that have added sugar. Instead, low-carb diets include foods that are high inprotein and fat. Why might you follow a low-carb diet? Low-carb diets may be used for a variety of reasons, such as for weight loss. People who have diabetes may use a low-carb diet to help manage their bloodsugar levels. What should you do before you start the diet? Talk to your doctor before you try any diet. This is even more important if you have health problems like kidney disease, heart disease, or diabetes. Your doctor may suggest that you meet with a registered dietitian. A dietitian canhelp you make an eating plan that works for you. What foods do you eat on a low-carb diet? On a low-carb diet, you choose foods that are high in protein and fat. Examplesof these are:  Meat, poultry, and fish. Eggs. Nuts, such as walnuts, pecans, almonds, and peanuts. Peanut butter and other nut butters. Tofu. Avocado. Rosalynd Peer. Non-starchy vegetables like broccoli, cauliflower, green beans, mushrooms, peppers, lettuce, and spinach. Unsweetened non-dairy milks like almond milk and coconut milk. Cheese, cottage cheese, and cream cheese. Where can you learn more? Go to https://Biomass CHPkareneb.CreativeD. org and sign in to your [employer] account. Enter C335 in the StyleSeek box to learn more about \"Learning About Low-Carbohydrate Diets. \"     If you do not have an account, please click on the \"Sign Up Now\" link. Current as of: September 8, 2021               Content Version: 13.3  © 7732-8901 Healthwise, Incorporated. Care instructions adapted under license by [facility] ([facility]).  If you have questions about a medical condition or this instruction, always ask your healthcare professional. [address] any warranty or liability for your use of this information. Patient Education        Prediabetes: Care Instructions  Overview     Prediabetes is a warning sign that you're at risk for getting type 2 diabetes. It means that your blood sugar is higher than it should be. But it's not highenough to be diabetes. The food you eat naturally turns into sugar. Your body uses the sugar for energy. Normally, an organ called the pancreas makes insulin. And insulin allows the sugar in your blood to get into your body's cells. But sometimes the body can't use insulin the right way. So the sugar stays in your blood instead. This is called insulin resistance. The buildup of sugar in your blood means youhave prediabetes. The good news is that you may be able to prevent or delay diabetes. Making small lifestyle changes, like getting active and changing your eating habits, may help you get your blood sugar back to normal. You can work with your doctorto make a treatment plan. Follow-up care is a key part of your treatment and safety. Be sure to make and go to all appointments, and call your doctor if you are having problems. It's also a good idea to know your test results and keep alist of the medicines you take. How can you care for yourself at home? Watch your weight. A healthy weight helps your body use insulin properly. Limit the amount of calories, sweets, and unhealthy fat you eat. Ask your doctor if you should see a dietitian. A registered dietitian can help you create meal plans that fit your lifestyle. Get at least 30 minutes of exercise on most days of the week. Exercise helps control your blood sugar. It also helps you maintain a healthy weight. Walking is a good choice. You also may want to do other activities, such as running, swimming, cycling, or playing tennis or team sports. Do not smoke. Smoking can make prediabetes worse. If you need help quitting, talk to your doctor about stop-smoking programs and medicines. These can increase your chances of quitting for good. If your doctor prescribed medicines, take them exactly as prescribed. Call your doctor if you think you are having a problem with your medicine. You will get more details on the specific medicines your doctor prescribes. When should you call for help? Watch closely for changes in your health, and be sure to contact your doctor if:    You have any symptoms of diabetes. These may include:  Being thirsty more often. Urinating more. Being hungrier. Losing weight. Being very tired. Having blurry vision. You have a wound that will not heal.     You have an infection that will not go away. You have problems with your blood pressure. You want more information about diabetes and how you can keep from getting it. Where can you learn more? Go to https://Capricorpepicewlesa.Wenjuan.com. org and sign in to your BoldIQ account. Enter I222 in the Nekted box to learn more about \"Prediabetes: Care Instructions. \"     If you do not have an account, please click on the \"Sign Up Now\" link. Current as of: July 28, 2021               Content Version: 13.3  © 2006-2022 Phytel. Care instructions adapted under license by [facility] [employer] [facility] ([facility]). If you have questions about a medical condition or this instruction, always ask your healthcare professional. [place] [address] any warranty or liability for your use of this information. Patient Education        High Cholesterol: Care Instructions  Overview     Cholesterol is a type of fat in your blood. It is needed for many body functions, such as making new cells. Cholesterol is made by your body. It also comes from food you eat. High cholesterol means that you have too much of thefat in your blood.  This raises your risk of a heart attack and stroke. LDL and HDL are part of your total cholesterol. LDL is the \"bad\" cholesterol. High LDL can raise your risk for coronary artery disease, heart attack, and stroke. HDL is the \"good\" cholesterol. It helps clear bad cholesterol from the body. High HDL is linked with a lower risk of coronary artery disease, heartattack, and stroke. Your cholesterol levels help your doctor find out your risk for having a heart attack or stroke. You and your doctor can talk about whether you need to loweryour risk and what treatment is best for you. Treatment options include a heart-healthy lifestyle and medicine. Both options can help lower your cholesterol and your risk. The way you choose to lower your risk will depend on how high your risk is for heart attack and stroke. It willalso depend on how you feel about taking medicines. Follow-up care is a key part of your treatment and safety. Be sure to make and go to all appointments, and call your doctor if you are having problems. It's also a good idea to know your test results and keep alist of the medicines you take. How can you care for yourself at home? Eat heart-healthy foods. Eat fruits, vegetables, whole grains, beans, and other high-fiber foods. Eat lean proteins, such as seafood, lean meats, beans, nuts, and soy products. Eat healthy fats, such as canola and olive oil. Choose foods that are low in saturated fat. Limit sodium and alcohol. Limit drinks and foods with added sugar. Be physically active. Try to do moderate activity at least 2½ hours a week. Or try to do vigorous activity at least 1¼ hours a week. You may want to walk or try other activities, such as running, swimming, cycling, or playing tennis or team sports. Stay at a healthy weight or lose weight by making the changes in eating and physical activity listed above. Losing just a small amount of weight, even 5 to 10 pounds, can help reduce your risk for having a heart attack or stroke.   Do not smoke.  Manage other health problems. These include diabetes and high blood pressure. If you think you may have a problem with alcohol or drug use, talk to your doctor. If you take medicine, take it exactly as prescribed. Call your doctor if you think you are having a problem with your medicine. Check with your doctor or pharmacist before you use any other medicines, including over-the-counter medicines. Make sure your doctor knows all of the medicines, vitamins, herbal products, and supplements you take. Taking some medicines together can cause problems. When should you call for help? Watch closely for changes in your health, and be sure to contact your doctor if:    You need help making lifestyle changes. You have questions about your medicine. Where can you learn more? Go to https://AllazoHealthpeTape TVeweb.Vivaldi Biosciences. org and sign in to your Global Animationz account. Enter Q499 in the AtomShockwave box to learn more about \"High Cholesterol: Care Instructions. \"     If you do not have an account, please click on the \"Sign Up Now\" link. Current as of: January 10, 2022               Content Version: 13.3  © 7103-1269 Healthwise, Incorporated. Care instructions adapted under license by [facility] ([facility]). If you have questions about a medical condition or this instruction, always ask your healthcare professional. [address] any warranty or liability for your use of this information.

## 2022-07-20 ASSESSMENT — ENCOUNTER SYMPTOMS: BACK PAIN: 0

## 2023-12-21 ENCOUNTER — TELEPHONE (OUTPATIENT)
Dept: FAMILY MEDICINE CLINIC | Age: 53
End: 2023-12-21

## 2023-12-21 NOTE — TELEPHONE ENCOUNTER
Noted. Thank you! Done, MyChart message, patient acknowledged that I resent the medication  No future appointments.

## 2023-12-21 NOTE — TELEPHONE ENCOUNTER
Incoming call came from pharmacy will like to know if  will agree to send in a dose appropriate for pt since they are kidney disease. They did suggest calling in 150mg along with a 100 mg dosage of medication below for pt.   nirmatrelvir/ritonavir 300/100 (PAXLOVID) 20 x 150 MG & 10 x 100MG TBPK [[id number]]    Order Details  Dose, Route, Frequency: As Directed   Dispense Quantity: 30 tablet Refills: 0          Sig: Take 3 tablets (two 150 mg nirmatrelvir and one 100 mg ritonavir tablets) by mouth every 12 hours for 5 days.          Start Date: 12/21/23 End Date: --   Written Date: 12/21/23 Expiration Date: 12/20/24       Associated Diagnoses: COVID-19 virus infection [U07.1]

## 2024-06-09 ENCOUNTER — HOSPITAL ENCOUNTER (EMERGENCY)
Age: 54
Discharge: HOME | End: 2024-06-09
Attending: EMERGENCY MEDICINE
Payer: COMMERCIAL

## 2024-06-09 ENCOUNTER — APPOINTMENT (OUTPATIENT)
Dept: GENERAL RADIOLOGY | Age: 54
End: 2024-06-09
Payer: COMMERCIAL

## 2024-06-09 VITALS
DIASTOLIC BLOOD PRESSURE: 84 MMHG | TEMPERATURE: 97.6 F | HEIGHT: 70 IN | SYSTOLIC BLOOD PRESSURE: 127 MMHG | OXYGEN SATURATION: 98 % | RESPIRATION RATE: 18 BRPM | WEIGHT: 260 LBS | HEART RATE: 105 BPM | BODY MASS INDEX: 37.22 KG/M2

## 2024-06-09 DIAGNOSIS — S61.412A LACERATION OF LEFT HAND WITHOUT FOREIGN BODY, INITIAL ENCOUNTER: Primary | ICD-10-CM

## 2024-06-09 PROCEDURE — 73130 X-RAY EXAM OF HAND: CPT

## 2024-06-09 PROCEDURE — 2500000003 HC RX 250 WO HCPCS: Performed by: EMERGENCY MEDICINE

## 2024-06-09 PROCEDURE — 99284 EMERGENCY DEPT VISIT MOD MDM: CPT

## 2024-06-09 PROCEDURE — 90471 IMMUNIZATION ADMIN: CPT | Performed by: EMERGENCY MEDICINE

## 2024-06-09 PROCEDURE — 6370000000 HC RX 637 (ALT 250 FOR IP): Performed by: EMERGENCY MEDICINE

## 2024-06-09 PROCEDURE — 6360000002 HC RX W HCPCS: Performed by: EMERGENCY MEDICINE

## 2024-06-09 PROCEDURE — 12004 RPR S/N/AX/GEN/TRK7.6-12.5CM: CPT

## 2024-06-09 PROCEDURE — 90715 TDAP VACCINE 7 YRS/> IM: CPT | Performed by: EMERGENCY MEDICINE

## 2024-06-09 RX ORDER — LIDOCAINE HYDROCHLORIDE 10 MG/ML
5 INJECTION, SOLUTION INFILTRATION; PERINEURAL ONCE
Status: COMPLETED | OUTPATIENT
Start: 2024-06-09 | End: 2024-06-09

## 2024-06-09 RX ORDER — CEPHALEXIN 250 MG/1
500 CAPSULE ORAL ONCE
Status: COMPLETED | OUTPATIENT
Start: 2024-06-09 | End: 2024-06-09

## 2024-06-09 RX ORDER — CEPHALEXIN 500 MG/1
500 CAPSULE ORAL 4 TIMES DAILY
Qty: 19 CAPSULE | Refills: 0 | Status: SHIPPED | OUTPATIENT
Start: 2024-06-09 | End: 2024-06-14

## 2024-06-09 RX ADMIN — CEPHALEXIN 500 MG: 250 CAPSULE ORAL at 14:11

## 2024-06-09 RX ADMIN — TETANUS TOXOID, REDUCED DIPHTHERIA TOXOID AND ACELLULAR PERTUSSIS VACCINE, ADSORBED 0.5 ML: 5; 2.5; 8; 8; 2.5 SUSPENSION INTRAMUSCULAR at 14:10

## 2024-06-09 RX ADMIN — LIDOCAINE HYDROCHLORIDE 10 ML: 10 INJECTION, SOLUTION INFILTRATION; PERINEURAL at 14:11

## 2024-06-09 ASSESSMENT — LIFESTYLE VARIABLES
HOW MANY STANDARD DRINKS CONTAINING ALCOHOL DO YOU HAVE ON A TYPICAL DAY: PATIENT DOES NOT DRINK
HOW OFTEN DO YOU HAVE A DRINK CONTAINING ALCOHOL: NEVER

## 2024-06-09 NOTE — ED PROVIDER NOTES
EMERGENCY DEPARTMENT ENCOUNTER    Pt Name: Joshua Jain  MRN: [id number]  Birthdate 1970  Date of evaluation: 6/9/24  CHIEF COMPLAINT       Chief Complaint   Patient presents with    Hand Injury     left     HISTORY OF PRESENT ILLNESS   54-year-old male presenting to the ER complaining of a chainsaw accident with his left hand.  Patient was wearing gloves but the accident still occurred.  Patient states the belt pulled back and he did injure the posterior aspect of his left hand.  Patient states that happened roughly 10 minutes prior to arrival to the ER.  Patient is unsure when his last tetanus shot was.    The history is provided by the patient.   Laceration  Location:  Hand  Hand laceration location:  Dorsum of L hand  Depth:  Cutaneous  Quality: straight    Associated symptoms: no fever and no rash            REVIEW OF SYSTEMS     Review of Systems   Constitutional:  Negative for activity change, fatigue and fever.   HENT:  Negative for congestion, ear pain and trouble swallowing.    Eyes:  Negative for photophobia and visual disturbance.   Respiratory:  Negative for cough and shortness of breath.    Cardiovascular:  Negative for chest pain and palpitations.   Gastrointestinal:  Negative for abdominal pain, diarrhea, nausea and vomiting.   Genitourinary:  Negative for dysuria, flank pain and urgency.   Musculoskeletal:  Negative for arthralgias and myalgias.   Skin:  Negative for color change and rash.   Neurological:  Negative for dizziness and facial asymmetry.   Psychiatric/Behavioral:  Negative for agitation and behavioral problems.      PASTMEDICAL HISTORY     Past Medical History:   Diagnosis Date    Sleep apnea ?    Maybe 20 years ago     Past Problem List  Patient Active Problem List   Diagnosis Code    KING on CPAP G47.33    Chronic fatigue R53.82    DELGADO (dyspnea on exertion) R06.09    Neurocardiogenic syncope R55    Severe obesity (BMI 35.0-39.9) with comorbidity (HCC) E66.01    Bilateral leg

## 2024-06-09 NOTE — ED NOTES
Mode of arrival (squad #, walk in, police, etc) : walk in        Chief complaint(s): chainsaw accident        Arrival Note (brief scenario, treatment PTA, etc).: Pt arrives to the ED with the complaint of hand injury from a chainsaw. Bleeding is controlled and picture was taken of the wound and in the patients chart. Pt has full ROM and PMS/cap refill is normal.         C= \"Have you ever felt that you should Cut down on your drinking?\"  No  A= \"Have people Annoyed you by criticizing your drinking?\"  No  G= \"Have you ever felt bad or Guilty about your drinking?\"  No  E= \"Have you ever had a drink as an Eye-opener first thing in the morning to steady your nerves or to help a hangover?\"  No      Deferred []      Reason for deferring: N/A    *If yes to two or more: probable alcohol abuse.*

## 2024-06-12 ENCOUNTER — TELEPHONE (OUTPATIENT)
Dept: FAMILY MEDICINE CLINIC | Age: 54
End: 2024-06-12

## 2024-06-12 NOTE — TELEPHONE ENCOUNTER
[facility] ED Follow up Call    Reason for ED visit:        6/9/2024 (1 hours)  [facility] ED     Rod Rhodes DO  Last attending  Treatment team Laceration of left hand without foreign body, initial encounter  Clinical impression Hand Injury   Chief complaint     Encounter Notes                 FU appts/Provider:    Future Appointments   Date Time Provider Department Center   6/18/2024  7:30 AM Antonio Ndiaye APRN - CNP fp sc [id number]       VOICEMAIL DOCUMENTATION - ERASE IF NOT USED  Hi, this message is for  ALICIA  This is EPIFANIO from Paula Ramirez office. Just calling to see how you are doing after your recent visit to the Emergency Room. Paula Ramirez wants to make sure you were able to fill any prescriptions and that you understand your discharge instructions. Please return our call if you need to make a follow up appointment with your provider or have any further needs.   Our phone number is [phone]. Have a great day.

## 2024-06-13 NOTE — TELEPHONE ENCOUNTER
[facility] ED Follow up Call    Reason for ED visit:  Laceration of left hand without foreign body, initial encounter      FU appts/Provider:    Future Appointments   Date Time Provider Department Center   6/18/2024  7:30 AM Antonio Ndiaye APRN - CNP fp [facility]

## 2024-06-18 ENCOUNTER — OFFICE VISIT (OUTPATIENT)
Dept: FAMILY MEDICINE CLINIC | Age: 54
End: 2024-06-18
Payer: COMMERCIAL

## 2024-06-18 VITALS
SYSTOLIC BLOOD PRESSURE: 126 MMHG | HEART RATE: 73 BPM | BODY MASS INDEX: 37.05 KG/M2 | TEMPERATURE: 97.2 F | DIASTOLIC BLOOD PRESSURE: 82 MMHG | HEIGHT: 70 IN | WEIGHT: 258.8 LBS | OXYGEN SATURATION: 97 %

## 2024-06-18 DIAGNOSIS — Z48.02 VISIT FOR SUTURE REMOVAL: ICD-10-CM

## 2024-06-18 DIAGNOSIS — S61.412D LACERATION OF LEFT HAND WITHOUT FOREIGN BODY, SUBSEQUENT ENCOUNTER: Primary | ICD-10-CM

## 2024-06-18 PROCEDURE — 99213 OFFICE O/P EST LOW 20 MIN: CPT | Performed by: NURSE PRACTITIONER

## 2024-06-18 PROCEDURE — G8417 CALC BMI ABV UP PARAM F/U: HCPCS | Performed by: NURSE PRACTITIONER

## 2024-06-18 PROCEDURE — 3017F COLORECTAL CA SCREEN DOC REV: CPT | Performed by: NURSE PRACTITIONER

## 2024-06-18 PROCEDURE — 1036F TOBACCO NON-USER: CPT | Performed by: NURSE PRACTITIONER

## 2024-06-18 PROCEDURE — G8427 DOCREV CUR MEDS BY ELIG CLIN: HCPCS | Performed by: NURSE PRACTITIONER

## 2024-06-18 SDOH — ECONOMIC STABILITY: HOUSING INSECURITY
IN THE LAST 12 MONTHS, WAS THERE A TIME WHEN YOU DID NOT HAVE A STEADY PLACE TO SLEEP OR SLEPT IN A SHELTER (INCLUDING NOW)?: NO

## 2024-06-18 SDOH — ECONOMIC STABILITY: FOOD INSECURITY: WITHIN THE PAST 12 MONTHS, YOU WORRIED THAT YOUR FOOD WOULD RUN OUT BEFORE YOU GOT MONEY TO BUY MORE.: NEVER TRUE

## 2024-06-18 SDOH — ECONOMIC STABILITY: FOOD INSECURITY: WITHIN THE PAST 12 MONTHS, THE FOOD YOU BOUGHT JUST DIDN'T LAST AND YOU DIDN'T HAVE MONEY TO GET MORE.: NEVER TRUE

## 2024-06-18 SDOH — ECONOMIC STABILITY: INCOME INSECURITY: HOW HARD IS IT FOR YOU TO PAY FOR THE VERY BASICS LIKE FOOD, HOUSING, MEDICAL CARE, AND HEATING?: NOT HARD AT ALL

## 2024-06-18 ASSESSMENT — PATIENT HEALTH QUESTIONNAIRE - PHQ9
SUM OF ALL RESPONSES TO PHQ QUESTIONS 1-9: 0
SUM OF ALL RESPONSES TO PHQ9 QUESTIONS 1 & 2: 0
2. FEELING DOWN, DEPRESSED OR HOPELESS: NOT AT ALL
SUM OF ALL RESPONSES TO PHQ QUESTIONS 1-9: 0
1. LITTLE INTEREST OR PLEASURE IN DOING THINGS: NOT AT ALL

## 2024-06-18 ASSESSMENT — ENCOUNTER SYMPTOMS
ABDOMINAL DISTENTION: 0
BACK PAIN: 0
NAUSEA: 0
DIARRHEA: 0
VOMITING: 0
COUGH: 0
WHEEZING: 0
ABDOMINAL PAIN: 0
CONSTIPATION: 0
CHEST TIGHTNESS: 0
SHORTNESS OF BREATH: 0

## 2024-06-18 NOTE — PROGRESS NOTES
Joshua Jain (:  1970) is a 54 y.o. male,Established patient, here for evaluation of the following chief complaint(s): ED Follow-up and Suture / Staple Removal (Left hand )      ASSESSMENT/PLAN:    Joshua received counseling on the following healthy behaviors: nutrition, exercise, and medication adherence  Reviewed prior labs and health maintenance  Discussed use, benefit, and side effects of prescribed medications.   Barriers to medication compliance addressed.   Patient given educational materials - see patient instructions  All patient questions answered.  Patient voiced understanding.  The patient's past medical,surgical, social, and family history as well as his current medications and allergies were reviewed as documented in today's encounter.    Medications, labs, diagnostic studies, consultations and follow-up as documented in this encounter.    Joshua was seen today for ed follow-up and suture / staple removal.    Diagnoses and all orders for this visit:    Laceration of left hand without foreign body, subsequent encounter  -Healing well  -No signs of infection  -Monitor to keep clean  -Educated on signs of infection  -See note below    Visit for suture removal    Prior to Visit Medications    Medication Sig Taking? Authorizing Provider   Misc. Devices (CPAP MACHINE) MISC Change Cpap to 8-18 cm EPR 3 length of need 99 QHS for 1 year Yes Darlyn Car MD   Multiple Vitamins-Minerals (MULTI-VITAMIN GUMMIES PO) Take by mouth Yes Darlyn Car MD   Cholecalciferol (VITAMIN D3) 50 MCG (2000) CAPS Take by mouth Yes Darlyn Car MD       Allergies   Allergen Reactions    Vicodin [Hydrocodone-Acetaminophen] Nausea And Vomiting       Past Medical History:   Diagnosis Date    Sleep apnea ?    Maybe 20 years ago       History reviewed. No pertinent surgical history.    Family History   Problem Relation Age of Onset    High Blood Pressure Mother     High Blood Pressure

## 2024-06-18 NOTE — PROGRESS NOTES
Visit Information    Have you changed or started any medications since your last visit including any over-the-counter medicines, vitamins, or herbal medicines? no   Have you stopped taking any of your medications? Is so, why? -  no  Are you having any side effects from any of your medications? - no    Have you seen any other physician or provider since your last visit?  no   Have you had any other diagnostic tests since your last visit?  yes -    Have you been seen in the emergency room and/or had an admission in a hospital since we last saw you?  yes -    Have you had your routine dental cleaning in the past 6 months?  yes -      Do you have an active MyChart account? If no, what is the barrier?  Yes    Patient Care Team:  Paula Mak MD as PCP - General (Family Medicine)  Paula Mak MD as PCP - Empaneled Provider  David Reyes MD as Consulting Physician (Pulmonology)  Rodri Wood MD as Consulting Physician (Nephrology)  Keegan Livingston MD as Surgeon (General Surgery)    Medical History Review  Past Medical, Family, and Social History reviewed and does contribute to the patient presenting condition    Health Maintenance   Topic Date Due    Hepatitis B vaccine (1 of 3 - 3-dose series) Never done    Depression Screen  Never done    Shingles vaccine (1 of 2) Never done    A1C test (Diabetic or Prediabetic)  04/02/2023    GFR test (Diabetes, CKD 3-4, OR last GFR 15-59)  04/02/2023    COVID-19 Vaccine (5 - 2023-24 season) 09/01/2023    Flu vaccine (Season Ended) 08/01/2024    Lipids  04/02/2027    Colorectal Cancer Screen  05/26/2027    DTaP/Tdap/Td vaccine (2 - Td or Tdap) 06/09/2034    Hepatitis C screen  Completed    HIV screen  Completed    Hepatitis A vaccine  Aged Out    Hib vaccine  Aged Out    Polio vaccine  Aged Out    Meningococcal (ACWY) vaccine  Aged Out    Pneumococcal 0-64 years Vaccine  Aged Out    Diabetes screen  Discontinued

## 2024-11-11 ENCOUNTER — HOSPITAL ENCOUNTER (OUTPATIENT)
Age: 54
Discharge: HOME | End: 2024-11-11
Payer: COMMERCIAL

## 2024-11-11 LAB
ANION GAP SERPL CALCULATED.3IONS-SCNC: 10 MMOL/L (ref 9–16)
BUN SERPL-MCNC: 10 MG/DL (ref 6–20)
CALCIUM SERPL-MCNC: 9.4 MG/DL (ref 8.6–10.4)
CHLORIDE SERPL-SCNC: 102 MMOL/L (ref 98–107)
CO2 SERPL-SCNC: 27 MMOL/L (ref 20–31)
CREAT SERPL-MCNC: 1.4 MG/DL (ref 0.7–1.2)
ERYTHROCYTE [DISTWIDTH] IN BLOOD BY AUTOMATED COUNT: 13.8 % (ref 11.5–14.9)
GFR, ESTIMATED: 60 ML/MIN/1.73M2
GLUCOSE SERPL-MCNC: 102 MG/DL (ref 74–99)
HCT VFR BLD AUTO: 47.7 % (ref 41–53)
HGB BLD-MCNC: 16.2 G/DL (ref 13.5–17.5)
MAGNESIUM SERPL-MCNC: 1.9 MG/DL (ref 1.6–2.6)
MCH RBC QN AUTO: 31.5 PG (ref 26–34)
MCHC RBC AUTO-ENTMCNC: 34 G/DL (ref 31–37)
MCV RBC AUTO: 92.7 FL (ref 80–100)
PHOSPHATE SERPL-MCNC: 3.7 MG/DL (ref 2.5–4.5)
PLATELET # BLD AUTO: 216 K/UL (ref 150–450)
PMV BLD AUTO: 8.1 FL (ref 6–12)
POTASSIUM SERPL-SCNC: 3.8 MMOL/L (ref 3.7–5.3)
RBC # BLD AUTO: 5.15 M/UL (ref 4.5–5.9)
SODIUM SERPL-SCNC: 139 MMOL/L (ref 136–145)
WBC OTHER # BLD: 6.7 K/UL (ref 3.5–11)

## 2024-11-11 PROCEDURE — 83735 ASSAY OF MAGNESIUM: CPT

## 2024-11-11 PROCEDURE — 85027 COMPLETE CBC AUTOMATED: CPT

## 2024-11-11 PROCEDURE — 84100 ASSAY OF PHOSPHORUS: CPT

## 2024-11-11 PROCEDURE — 80048 BASIC METABOLIC PNL TOTAL CA: CPT

## 2024-11-11 PROCEDURE — 36415 COLL VENOUS BLD VENIPUNCTURE: CPT

## 2024-11-11 NOTE — RESULT ENCOUNTER NOTE
Please notify patient if he is missing the appointment tomorrow: Blood glucose 102, mildly increased, low-carb diet is advisable  Kidney disease stage IIIa, improved from prior. In the past he has seen Dr Wood.  Once a year he should follow-up with Dr. Wood, will discuss at appointment tomorrow  Otherwise labs within normal limits  continue current treatment    Future Appointments  11/12/2024 9:00 AM    Paula Mak MD    fp sc               [facility] DE[facility]

## 2024-11-12 ENCOUNTER — OFFICE VISIT (OUTPATIENT)
Dept: FAMILY MEDICINE CLINIC | Age: 54
End: 2024-11-12

## 2024-11-12 VITALS
WEIGHT: 264.2 LBS | TEMPERATURE: 97.6 F | DIASTOLIC BLOOD PRESSURE: 88 MMHG | BODY MASS INDEX: 37.82 KG/M2 | HEIGHT: 70 IN | HEART RATE: 74 BPM | SYSTOLIC BLOOD PRESSURE: 138 MMHG | OXYGEN SATURATION: 98 %

## 2024-11-12 DIAGNOSIS — Z00.01 ENCOUNTER FOR WELL ADULT EXAM WITH ABNORMAL FINDINGS: Primary | ICD-10-CM

## 2024-11-12 DIAGNOSIS — G89.29 CHRONIC MIDLINE LOW BACK PAIN WITHOUT SCIATICA: ICD-10-CM

## 2024-11-12 DIAGNOSIS — M54.50 CHRONIC MIDLINE LOW BACK PAIN WITHOUT SCIATICA: ICD-10-CM

## 2024-11-12 DIAGNOSIS — N18.31 STAGE 3A CHRONIC KIDNEY DISEASE (HCC): ICD-10-CM

## 2024-11-12 DIAGNOSIS — Z11.59 ENCOUNTER FOR SCREENING FOR OTHER VIRAL DISEASES: ICD-10-CM

## 2024-11-12 DIAGNOSIS — Z13.6 SCREENING FOR CARDIOVASCULAR CONDITION: ICD-10-CM

## 2024-11-12 DIAGNOSIS — G47.33 OSA ON CPAP: ICD-10-CM

## 2024-11-12 DIAGNOSIS — E66.01 SEVERE OBESITY (BMI 35.0-39.9) WITH COMORBIDITY: ICD-10-CM

## 2024-11-12 DIAGNOSIS — R73.03 PREDIABETES: ICD-10-CM

## 2024-11-12 DIAGNOSIS — Z12.5 PROSTATE CANCER SCREENING: ICD-10-CM

## 2024-11-12 LAB — HBA1C MFR BLD: 5.8 %

## 2024-11-12 RX ORDER — YEAST,DRIED (S. CEREVISIAE)
1 POWDER (GRAM) ORAL DAILY
Qty: 90 TABLET | Refills: 0
Start: 2024-11-12

## 2024-11-12 RX ORDER — BACLOFEN 10 MG/1
10 TABLET ORAL 3 TIMES DAILY PRN
Qty: 90 TABLET | Refills: 0 | Status: SHIPPED | OUTPATIENT
Start: 2024-11-12

## 2024-11-12 SDOH — ECONOMIC STABILITY: FOOD INSECURITY: WITHIN THE PAST 12 MONTHS, YOU WORRIED THAT YOUR FOOD WOULD RUN OUT BEFORE YOU GOT MONEY TO BUY MORE.: NEVER TRUE

## 2024-11-12 SDOH — ECONOMIC STABILITY: INCOME INSECURITY: HOW HARD IS IT FOR YOU TO PAY FOR THE VERY BASICS LIKE FOOD, HOUSING, MEDICAL CARE, AND HEATING?: NOT VERY HARD

## 2024-11-12 SDOH — ECONOMIC STABILITY: FOOD INSECURITY: WITHIN THE PAST 12 MONTHS, THE FOOD YOU BOUGHT JUST DIDN'T LAST AND YOU DIDN'T HAVE MONEY TO GET MORE.: NEVER TRUE

## 2024-11-12 ASSESSMENT — ENCOUNTER SYMPTOMS
SHORTNESS OF BREATH: 0
DIARRHEA: 0
CONSTIPATION: 0
BACK PAIN: 0
ABDOMINAL DISTENTION: 0
WHEEZING: 0
ABDOMINAL PAIN: 0
CHEST TIGHTNESS: 0
VOMITING: 0
APNEA: 1
NAUSEA: 0
COUGH: 0

## 2024-11-12 ASSESSMENT — PATIENT HEALTH QUESTIONNAIRE - PHQ9
SUM OF ALL RESPONSES TO PHQ QUESTIONS 1-9: 1
SUM OF ALL RESPONSES TO PHQ QUESTIONS 1-9: 1
SUM OF ALL RESPONSES TO PHQ9 QUESTIONS 1 & 2: 1
2. FEELING DOWN, DEPRESSED OR HOPELESS: NOT AT ALL
1. LITTLE INTEREST OR PLEASURE IN DOING THINGS: SEVERAL DAYS
SUM OF ALL RESPONSES TO PHQ QUESTIONS 1-9: 1
SUM OF ALL RESPONSES TO PHQ QUESTIONS 1-9: 1

## 2024-11-12 ASSESSMENT — VISUAL ACUITY
OD_CC: 20/25
OS_CC: 20/30

## 2024-11-12 NOTE — RESULT ENCOUNTER NOTE
Addressed during office visit today, hemoglobin A1c 5.8, improved prediabetes  Low carb, low fat diet, increase fruits and vegetables, and exercise 4-5 times a week 30-40 minutes a day, or walk 1-2 hours per day, or wear a pedometer and get at least 10,000 steps per day.       DISPLAY PLAN FREE TEXT

## 2024-11-12 NOTE — PROGRESS NOTES
Well Adult Note  Name: Joshua Jain Today’s Date: 2024   MRN: [id number] Sex: Male   Age: 54 y.o. Ethnicity: Non- / Non    : 1970 Race: White (non-)      Joshua Jain is here for a well adult exam.       Assessment & Plan   Encounter for well adult exam with abnormal findings  Low carb, low fat diet, increase fruits and vegetables, and exercise 4-5 times a week 30-40 minutes a day, or walk 1-2 hours per day, or wear a pedometer and get at least 10,000 steps per day.  Counseling given for dental cleaning every 6 months   Counseling given for annual eye exam yearly  Counseling given for vaccines flu shot, COVID-19 vaccine and shingles vaccine at the pharmacy.  He declines flu shot today  We will check PSA screening  Lab Results   Component Value Date    PSA 0.51 2022   Will check lipid panel screening  Will check hepatitis B immunity screening  Date of last Colonoscopy: 2022  He is up-to-date with colonoscopy      Chronic midline low back pain without sciatica  Chronic, ongoing, failed to resolve  Will do x-ray to rule out bone disorder and to evaluate for severity of degenerative disc disease  Stretching, repositioning, heating pad, muscle relaxant as needed, could also consider over-the-counter collagen supplement  He declines physical therapy at this time  If the pain persists, to call back for referral to pain management  -     XR LUMBAR SPINE (2-3 VIEWS); Future  -     Collagen-Boron-Hyaluronic Acid (MOVE FREE [employer] HEALTH) 40-5-3.3 MG TABS; Take 1 tablet by mouth daily, Disp-90 tablet, R-0NO PRINT  -     baclofen (LIORESAL) 10 MG tablet; Take 1 tablet by mouth 3 times daily as needed (MUSCLE SPASMS) Causes sedation, do not drive while taking this medication, Disp-90 tablet, R-0Normal  -     AL OFFICE/OUTPATIENT ESTABLISHED MOD MDM 30-39 MIN  Prediabetes  Chronic but improving  Low-carb diet, exercise, cut down on beer 0 which might have a lot of

## 2024-11-12 NOTE — PROGRESS NOTES
Visit Information    Have you changed or started any medications since your last visit including any over-the-counter medicines, vitamins, or herbal medicines? no   Have you stopped taking any of your medications? Is so, why? -  no  Are you having any side effects from any of your medications? - no    Have you seen any other physician or provider since your last visit?  no   Have you had any other diagnostic tests since your last visit?  no   Have you been seen in the emergency room and/or had an admission in a hospital since we last saw you?  no   Have you had your routine dental cleaning in the past 6 months?  yes -      Do you have an active MyChart account? If no, what is the barrier?  Yes    Patient Care Team:  Paula Mak MD as PCP - General (Family Medicine)  Paula Mak MD as PCP - Empaneled Provider  David Reyes MD as Consulting Physician (Pulmonology)  Rodri Wood MD as Consulting Physician (Nephrology)  Keegan Livingston MD as Surgeon (General Surgery)    Medical History Review  Past Medical, Family, and Social History reviewed and does contribute to the patient presenting condition    Health Maintenance   Topic Date Due    Hepatitis B vaccine (1 of 3 - 19+ 3-dose series) Never done    Shingles vaccine (1 of 2) Never done    A1C test (Diabetic or Prediabetic)  04/02/2023    Flu vaccine (1) Never done    COVID-19 Vaccine (5 - 2023-24 season) 09/01/2024    Depression Screen  06/18/2025    GFR test (Diabetes, CKD 3-4, OR last GFR 15-59)  11/11/2025    Lipids  04/02/2027    Colorectal Cancer Screen  05/26/2027    DTaP/Tdap/Td vaccine (2 - Td or Tdap) 06/09/2034    Hepatitis C screen  Completed    HIV screen  Completed    Hepatitis A vaccine  Aged Out    Hib vaccine  Aged Out    Polio vaccine  Aged Out    Meningococcal (ACWY) vaccine  Aged Out    Pneumococcal 0-64 years Vaccine  Aged Out    Diabetes screen  Discontinued

## 2024-11-17 PROBLEM — R06.09 DOE (DYSPNEA ON EXERTION): Status: RESOLVED | Noted: 2022-03-28 | Resolved: 2024-11-17

## 2024-11-23 LAB
A/G RATIO: NORMAL
ALBUMIN: 4.2 G/DL
ALP BLD-CCNC: 68 U/L
ALT SERPL-CCNC: 20 U/L
ANTIBODY: <8
AST SERPL-CCNC: 17 U/L
BILIRUB SERPL-MCNC: 0.6 MG/DL (ref 0.1–1.4)
BILIRUBIN DIRECT: 0.1 MG/DL
BILIRUBIN, INDIRECT: NORMAL
CHOLESTEROL, TOTAL: 201 MG/DL
CHOLESTEROL/HDL RATIO: 5.2
GLOBULIN: NORMAL
HDLC SERPL-MCNC: 39 MG/DL (ref 35–70)
LDL CHOLESTEROL: 135
NONHDLC SERPL-MCNC: NORMAL MG/DL
PROSTATE SPECIFIC ANTIGEN: 0.33 NG/ML
TOTAL PROTEIN: 7.2 G/DL (ref 6.4–8.2)
TRIGL SERPL-MCNC: 133 MG/DL
TSH SERPL DL<=0.05 MIU/L-ACNC: 3.12 UIU/ML
VLDLC SERPL CALC-MCNC: 27 MG/DL

## 2024-11-25 DIAGNOSIS — M54.50 CHRONIC MIDLINE LOW BACK PAIN WITHOUT SCIATICA: ICD-10-CM

## 2024-11-25 DIAGNOSIS — Z12.5 PROSTATE CANCER SCREENING: ICD-10-CM

## 2024-11-25 DIAGNOSIS — Z13.6 SCREENING FOR CARDIOVASCULAR CONDITION: ICD-10-CM

## 2024-11-25 DIAGNOSIS — Z11.59 ENCOUNTER FOR SCREENING FOR OTHER VIRAL DISEASES: ICD-10-CM

## 2024-11-25 DIAGNOSIS — G89.29 CHRONIC MIDLINE LOW BACK PAIN WITHOUT SCIATICA: ICD-10-CM

## 2024-11-25 DIAGNOSIS — E66.01 SEVERE OBESITY (BMI 35.0-39.9) WITH COMORBIDITY: ICD-10-CM

## 2024-11-25 PROBLEM — M51.360 DEGENERATION OF INTERVERTEBRAL DISC OF LUMBAR REGION WITH DISCOGENIC BACK PAIN: Status: ACTIVE | Noted: 2024-11-25

## 2024-11-25 NOTE — RESULT ENCOUNTER NOTE
Please notify patient: Degenerative changes in the lumbar spine  Could consider physical therapy, pain management, after 6 weeks of physical therapy if the pain does not improve, then  MRI lumbar spine will be medically necessary.  This let me know what he would like?  He can do both physical therapy and pain management, or only 1 which ever he wants    Future Appointments  5/6/2025   4:00 PM    Paula Mak MD    [facility] DEP  11/13/2025 7:30 AM    Paula Mak MD    [facility] DEP

## 2024-11-25 NOTE — RESULT ENCOUNTER NOTE
Please notify patient: Thyroid function within normal limits  Liver function test within normal limits  Cholesterol is still high: LDL bad cholesterol high worse than before, total cholesterol high, worse than before, ratio is high slightly improved from prior.  he would benefit from statin, a small dosage, to prevent strokes and heart attacks, with he agreed for me to send to the pharmacy Lipitor 10 Mg daily?  Together with lifestyle changes it would help him.  Prostate cancer screening within normal limits  He is not immune against hepatitis B he will need hepatitis B vaccine      Otherwise labs within normal limits  continue current treatment    Future Appointments  5/6/2025   4:00 PM    Paula Mak MD    fp [facility] DEP  11/13/2025 7:30 AM    Paula Mak MD    fp [facility] DEP

## 2024-11-27 ENCOUNTER — PATIENT MESSAGE (OUTPATIENT)
Dept: FAMILY MEDICINE CLINIC | Age: 54
End: 2024-11-27

## 2024-12-16 NOTE — TELEPHONE ENCOUNTER
Patient called back and stated that he does not know what he wants to do he is unsure will like more  to have a recommendation as he does not want to take any pain medications also he is unsure if he should be using a back brace to help relive the pain? He is just unsure what to do. He will like a [employer] message sent back to him from  with recommendation.

## 2024-12-16 NOTE — TELEPHONE ENCOUNTER
MALU per my note    Chronic midline low back pain without sciatica  Chronic, ongoing, failed to resolve  Will do x-ray to rule out bone disorder and to evaluate for severity of degenerative disc disease  Stretching, repositioning, heating pad, muscle relaxant as needed, could also consider over-the-counter collagen supplement  He declines physical therapy at this time  If the pain persists, to call back for referral to pain management  -     XR LUMBAR SPINE (2-3 VIEWS); Future  -     Collagen-Boron-Hyaluronic Acid ([employer]) 40-5-3.3 MG TABS; Take 1 tablet by mouth daily, Disp-90 tablet, R-0NO PRINT  -     baclofen (LIORESAL) 10 MG tablet; Take 1 tablet by mouth 3 times daily as needed (MUSCLE SPASMS) Causes sedation, do not drive while taking this medication, Disp-90 tablet, R-0Normal

## 2024-12-19 ENCOUNTER — TELEPHONE (OUTPATIENT)
Dept: FAMILY MEDICINE CLINIC | Age: 54
End: 2024-12-19

## 2024-12-23 ENCOUNTER — PATIENT MESSAGE (OUTPATIENT)
Dept: FAMILY MEDICINE CLINIC | Age: 54
End: 2024-12-23

## 2024-12-23 DIAGNOSIS — M54.50 CHRONIC MIDLINE LOW BACK PAIN WITHOUT SCIATICA: Primary | ICD-10-CM

## 2024-12-23 DIAGNOSIS — G89.29 CHRONIC MIDLINE LOW BACK PAIN WITHOUT SCIATICA: Primary | ICD-10-CM

## 2025-01-06 ENCOUNTER — HOSPITAL ENCOUNTER (OUTPATIENT)
Dept: PHYSICAL THERAPY | Age: 55
Setting detail: THERAPIES SERIES
Discharge: HOME | End: 2025-01-06
Attending: FAMILY MEDICINE
Payer: COMMERCIAL

## 2025-01-06 PROCEDURE — 97161 PT EVAL LOW COMPLEX 20 MIN: CPT

## 2025-01-06 PROCEDURE — 97110 THERAPEUTIC EXERCISES: CPT

## 2025-01-06 NOTE — CONSULTS
Training     []  Dry Needling           1-2 muscles     []  Dry Needling           3 or more muscles     [] Vasocompression     []  Other       Time in: 5:13 pm    Time Out: 6:10 pm   TOTAL  TIME: 57     Total billable time: 57     Electronically signed by: Marly Sapp PT        Physician Signature:________________________________Date:__________________  By signing above or cosigning this note, I have reviewed this plan of care and certify a need for medically necessary rehabilitation services.     *PLEASE SIGN ABOVE AND FAX BACK ALL PAGES*

## 2025-01-09 ENCOUNTER — HOSPITAL ENCOUNTER (OUTPATIENT)
Dept: PHYSICAL THERAPY | Age: 55
Setting detail: THERAPIES SERIES
Discharge: HOME | End: 2025-01-09
Attending: FAMILY MEDICINE
Payer: COMMERCIAL

## 2025-01-09 PROCEDURE — 97110 THERAPEUTIC EXERCISES: CPT

## 2025-01-09 NOTE — FLOWSHEET NOTE
[facility]   Outpatient Rehabilitation & Therapy  [address] [facility] [address]  P: [phone]   F: [phone]    Physical Therapy Daily Treatment Note      Date:  2025  Patient Name:  oJshua Jain    :  1970  MRN: [id number]  Physician: Paula Mak MD                              Insurance: Medical [place] /  visits remaining w/ OT --deductible not met at this time  CPT codes verified:[phone](based on medical necessity)[phone]([phone](2)   Medical Diagnosis: M54.50, G89.29 (ICD-10-CM) - Chronic midline low back pain without sciatica            Rehab Codes: M62.81 , M25.6 , M54.50   Onset Date: 2024 -referral                   Next 's appt: 2025 -referring doctor   Visit# / total visits: 2/8  Cancels/No Shows: 0/0    Precautions: spondylolisthesis (avoid excessive lumbar rotation and lumbar flexion) , L5 spondylolysis     Subjective:  Patient reporting more soreness in his legs with HEP.  Patient continues to note constant pain in his back.  Patient reporting he completed his HEP at least 1x since eval.    Pain:  [x] Yes  [] No Location: low back Pain Rating: (0-10 scale) 2/10  Pain altered Tx:  [] No  [] Yes  Action:  Comments:    Objective:  Modalities:   Precautions:  Exercises:  INTERVENTIONS  Reps/ Time Weight/ Level Completed  Today Comments               MODALITIES                                    MANUAL                                    EXERCISES            Supine       Hamstring stretch 3x 30\"  x    Modified celestine stretch 3x 30\"  x    Supine Marches w/ Abdominal Bracing 10 x   x     Abdominal bracing 10x 5\"  x    Supine Piriformis Stretch 1 x eac   x Demonstration with good carry over  One LE straight with opposite LE hooked around    Single knee fall out 10x2   x     Modified dead bug 10x2  x Alt UE /Alt LE    chest press and OH press with med ball 10x ea 4# x     Mini squats to table 10x  x    PPT on

## 2025-01-16 ENCOUNTER — HOSPITAL ENCOUNTER (OUTPATIENT)
Dept: PHYSICAL THERAPY | Age: 55
Setting detail: THERAPIES SERIES
Discharge: HOME | End: 2025-01-16
Attending: FAMILY MEDICINE
Payer: COMMERCIAL

## 2025-01-16 PROCEDURE — 97110 THERAPEUTIC EXERCISES: CPT

## 2025-01-16 NOTE — FLOWSHEET NOTE
[facility]   Outpatient Rehabilitation & Therapy  [address]  P: [phone]   F: [phone]    Physical Therapy Daily Treatment Note      Date:  2025  Patient Name:  Joshua Jain    :  1970  MRN: [id number]  Physician: Paula Mak MD                              Insurance: Medical [place]  visits remaining w/ OT --deductible not met at this time  CPT codes verified:[phone](based on medical necessity)[phone]([phone]([phone]([phone](2)   Medical Diagnosis: M54.50, G89.29 (ICD-10-CM) - Chronic midline low back pain without sciatica            Rehab Codes: M62.81 , M25.6 , M54.50   Onset Date: 2024 -referral                   Next 's appt: 2025 -referring doctor   Visit# / total visits: 3/8  Cancels/No Shows: 0/0    Precautions: spondylolisthesis (avoid excessive lumbar rotation and lumbar flexion) , L5 spondylolysis     Subjective:  Patient reporting to therapy with inc groin pain from performing HEP.  Patient reporting he cleared snow from drive way and had inc pain.    Pain:  [x] Yes  [] No Location: low back <groin Pain Rating: (0-10 scale) 1/10  Pain altered Tx:  [] No  [] Yes  Action:  Comments:    Objective:  Modalities:   Precautions:  Exercises:  INTERVENTIONS  Reps/ Time Weight/ Level Completed  Today Comments               MODALITIES                                    MANUAL                                    EXERCISES            Supine       Hamstring stretch 3x 30\"  x    Modified celestine stretch 3x 30\"  x    Supine Marches w/ Abdominal Bracing 10 x   x  16 inc pain in L groin   Abdominal bracing 10x 5\"      Supine Piriformis Stretch 3 x eac 30\"   x Demonstration with good carry over  One LE straight with opposite LE hooked around    Single knee fall out 10x2   x     Modified dead bug 10x2  x Alt UE /Alt LE    chest press and OH press with med ball 10x ea 4# x     Mini squats to table 10x      PPT on

## 2025-01-23 ENCOUNTER — HOSPITAL ENCOUNTER (OUTPATIENT)
Dept: PHYSICAL THERAPY | Age: 55
Setting detail: THERAPIES SERIES
Discharge: HOME | End: 2025-01-23
Attending: FAMILY MEDICINE
Payer: COMMERCIAL

## 2025-01-23 PROCEDURE — 97110 THERAPEUTIC EXERCISES: CPT

## 2025-01-23 NOTE — FLOWSHEET NOTE
[facility]   Outpatient Rehabilitation & Therapy  [address] [facility] [address]  P: [phone]   F: [phone]    Physical Therapy Daily Treatment Note      Date:  2025  Patient Name:  Joshua Jain    :  1970  MRN: [id number]  Physician: Paula Mak MD                              Insurance: Medical [place]  visits remaining w/ OT --deductible not met at this time  CPT codes verified:[phone](based on medical necessity)[phone](2)   Medical Diagnosis: M54.50, G89.29 (ICD-10-CM) - Chronic midline low back pain without sciatica            Rehab Codes: M62.81 , M25.6 , M54.50   Onset Date: 2024 -referral                   Next 's appt: 2025 -referring doctor   Visit# / total visits:   Cancels/No Shows: 0/0    Precautions: spondylolisthesis (avoid excessive lumbar rotation and lumbar flexion) , L5 spondylolysis     Subjective:  Patient comes in reporting mild pain. Reports that last week for 2 days after the last session he felt really good, but after working on the car he had increases in pain with a lot of bending over and standing. States he is not in constant pain anymore. He is still noting increases in pain with just walking or doing anything active in general. Pain went up to 5/10 over the weekend.     Pain:  [x] Yes  [] No Location: low back and L gluteal area Pain Rating: (0-10 scale) 1/10  Pain altered Tx:  [x] No  [] Yes  Action:  Comments:    Objective:  Modalities:   Precautions:  Exercises:  INTERVENTIONS  Reps/ Time Weight/ Level Completed  Today Comments               MODALITIES                                    MANUAL             Lumbar PA  3 min  Grade 2  x  L3-L4               EXERCISES            Supine       Hamstring stretch 3x 30\"      Modified celestine stretch 3x 30\"      Supine Marches w/ Abdominal Bracing 10 x  red x   inc pain in L groin   SLR 10 x  x Added    Abdominal

## 2025-01-30 ENCOUNTER — HOSPITAL ENCOUNTER (OUTPATIENT)
Dept: PHYSICAL THERAPY | Age: 55
Setting detail: THERAPIES SERIES
Discharge: HOME | End: 2025-01-30
Attending: FAMILY MEDICINE
Payer: COMMERCIAL

## 2025-01-30 PROCEDURE — 97110 THERAPEUTIC EXERCISES: CPT

## 2025-01-30 NOTE — FLOWSHEET NOTE
[facility]   Outpatient Rehabilitation & Therapy  [address]  P: [phone]   F: [phone]    Physical Therapy Daily Treatment Note      Date:  2025  Patient Name:  Joshua Jain    :  1970  MRN: [id number]  Physician: Paula Mak MD                              Insurance: Medical [place]  visits remaining w/ OT --deductible not met at this time  CPT codes verified:[phone](based on medical necessity)[phone]([phone](2)   Medical Diagnosis: M54.50, G89.29 (ICD-10-CM) - Chronic midline low back pain without sciatica            Rehab Codes: M62.81 , M25.6 , M54.50   Onset Date: 2024 -referral                   Next 's appt: 2025 -referring doctor   Visit# / total visits:   Cancels/No Shows: 0/0    Precautions: spondylolisthesis (avoid excessive lumbar rotation and lumbar flexion) , L5 spondylolysis     Subjective:  Patient reports that he does feel like it is better. The pain used to constant but now is not too bad. Reports that if he had to sit in chair al day he used to have a lot of pain but now the pain is not as intense. Movement still really causes the pain and that intensity has not improved. Repots that after last session he had a lot of increases in pain.     Pain:  [x] Yes  [] No Location: low back and L gluteal area Pain Rating: (0-10 scale) 0.5/10  Pain altered Tx:  [x] No  [] Yes  Action:  Comments:    Objective:  Modalities:     Exercises:  INTERVENTIONS  Reps/ Time Weight/ Level Completed  Today Comments               MODALITIES                                    MANUAL             Lumbar PA  3 min  Grade 2    L3-L4               EXERCISES            Supine       Hamstring stretch 3x 30\"      Modified celestine stretch 3x 30\"      Supine Marches w/ Abdominal Bracing 10 x 2 green x   inc pain in L groin  Increase sets and resistance    SLR 10 x 2  x Added   Increase set

## 2025-02-06 ENCOUNTER — HOSPITAL ENCOUNTER (OUTPATIENT)
Dept: PHYSICAL THERAPY | Age: 55
Setting detail: THERAPIES SERIES
Discharge: HOME | End: 2025-02-06
Attending: FAMILY MEDICINE
Payer: COMMERCIAL

## 2025-02-06 PROCEDURE — 97110 THERAPEUTIC EXERCISES: CPT

## 2025-02-06 NOTE — FLOWSHEET NOTE
[facility]   Outpatient Rehabilitation & Therapy  [address] [facility] [address]  P: [phone]   F: [phone]    Physical Therapy Daily Treatment Note      Date:  2025  Patient Name:  Joshua Jain    :  1970  MRN: [id number]  Physician: Paula Mak MD                              Insurance: Medical [place] /  visits remaining w/ OT --deductible not met at this time  CPT codes verified:[phone](based on medical necessity)[phone]([phone](2)   Medical Diagnosis: M54.50, G89.29 (ICD-10-CM) - Chronic midline low back pain without sciatica            Rehab Codes: M62.81 , M25.6 , M54.50   Onset Date: 2024 -referral                   Next 's appt: 2025 -referring doctor   Visit# / total visits:   Cancels/No Shows: 0/0    Precautions: spondylolisthesis (avoid excessive lumbar rotation and lumbar flexion) , L5 spondylolysis     Subjective:  Patient arrived and states he has no new complaints and nothing new to report. States he has very minimal pain.    Pain:  [x] Yes  [x] No Location: low back and L gluteal area Pain Rating: (0-10 scale) 0.5/10  Pain altered Tx:  [x] No  [] Yes  Action:  Comments:    Objective:  Modalities:     Exercises:  INTERVENTIONS  Reps/ Time Weight/ Level Completed  Today Comments               MODALITIES                                    MANUAL             Lumbar PA  3 min  Grade 2    L3-L4               EXERCISES            Supine       Hamstring stretch 3x 30\"      Modified celestine stretch 3x 30\"      Supine Marches w/ Abdominal Bracing 10 x 2 green x   inc pain in L groin  Increase sets and resistance    SLR 10 x 2  x Added   Increase set    Abdominal bracing 10x 5\"      Supine Piriformis Stretch 3 x eac 30\"    Demonstration with good carry over  One LE straight with opposite LE hooked around    Single knee fall out 15x2 green x  increase reps   Increase resistance

## 2025-02-13 ENCOUNTER — HOSPITAL ENCOUNTER (OUTPATIENT)
Dept: PHYSICAL THERAPY | Age: 55
Setting detail: THERAPIES SERIES
Discharge: HOME | End: 2025-02-13
Attending: FAMILY MEDICINE
Payer: COMMERCIAL

## 2025-02-13 PROCEDURE — 97110 THERAPEUTIC EXERCISES: CPT

## 2025-02-13 NOTE — THERAPY DISCHARGE
[facility]   Outpatient Rehabilitation & Therapy  [address]  P: [phone]   F: [phone]    Physical Therapy Discharge Summary      Date:  2025  Patient Name:  Joshua Jain    :  1970  MRN: [id number]  Physician: Paula Mak MD                              Insurance: Medical [place] /  visits remaining w/ OT --deductible not met at this time  CPT codes verified:[phone](based on medical necessity)[phone](2)   Medical Diagnosis: M54.50, G89.29 (ICD-10-CM) - Chronic midline low back pain without sciatica            Rehab Codes: M62.81 , M25.6 , M54.50   Onset Date: 2024 -referral                   Next 's appt: 2025 -referring doctor   Visit# / total visits: 7/8  Cancels/No Shows: 0/0    Precautions: spondylolisthesis (avoid excessive lumbar rotation and lumbar flexion) , L5 spondylolysis     Subjective: Patient reports that therapy has but also has not been helping. He notes that shoveling snow with watching his body mechanics still really causes pain. Notes that he worked on his car Saturday and all day  he was in a lot of pain. Notes that sitting and not doing anything is when the pain is not as constant which is were there is improvement. However, overall he notes that therapy just has not seemed to help him to where he was hoping. The pain is just not as constant. Pain at worst over the past weekend was 4/10 pain. Does do his HEP and thinks that it does help. Does not have pain with prolonged sitting. Does note some increases in pain with prolonged standing.     Pain:  [x] Yes  [] No Location: low back and L gluteal area Pain Rating: (0-10 scale) 1/10  Pain altered Tx:  [x] No  [] Yes  Action:  Comments:    Objective:         Range of Motion  Left Range of Motion  Right Strength  Left Strength  Right   Lumbar Flexion 75%  Pain        Lumbar Extension WFL  Pain central lumbar

## 2025-02-20 ENCOUNTER — APPOINTMENT (OUTPATIENT)
Dept: PHYSICAL THERAPY | Age: 55
End: 2025-02-20
Attending: FAMILY MEDICINE
Payer: COMMERCIAL

## 2025-05-04 ASSESSMENT — PATIENT HEALTH QUESTIONNAIRE - PHQ9
2. FEELING DOWN, DEPRESSED OR HOPELESS: NOT AT ALL
1. LITTLE INTEREST OR PLEASURE IN DOING THINGS: NOT AT ALL
SUM OF ALL RESPONSES TO PHQ QUESTIONS 1-9: 0
2. FEELING DOWN, DEPRESSED OR HOPELESS: NOT AT ALL
SUM OF ALL RESPONSES TO PHQ QUESTIONS 1-9: 0
SUM OF ALL RESPONSES TO PHQ9 QUESTIONS 1 & 2: 0
SUM OF ALL RESPONSES TO PHQ QUESTIONS 1-9: 0
SUM OF ALL RESPONSES TO PHQ QUESTIONS 1-9: 0
1. LITTLE INTEREST OR PLEASURE IN DOING THINGS: NOT AT ALL

## 2025-05-06 ENCOUNTER — OFFICE VISIT (OUTPATIENT)
Dept: FAMILY MEDICINE CLINIC | Age: 55
End: 2025-05-06
Payer: COMMERCIAL

## 2025-05-06 VITALS
HEART RATE: 76 BPM | HEIGHT: 70 IN | TEMPERATURE: 97.6 F | DIASTOLIC BLOOD PRESSURE: 80 MMHG | WEIGHT: 264.5 LBS | SYSTOLIC BLOOD PRESSURE: 124 MMHG | OXYGEN SATURATION: 99 % | BODY MASS INDEX: 37.87 KG/M2

## 2025-05-06 DIAGNOSIS — N18.31 STAGE 3A CHRONIC KIDNEY DISEASE (HCC): ICD-10-CM

## 2025-05-06 DIAGNOSIS — G89.29 CHRONIC MIDLINE LOW BACK PAIN WITHOUT SCIATICA: Primary | ICD-10-CM

## 2025-05-06 DIAGNOSIS — E78.5 HYPERLIPIDEMIA WITH TARGET LDL LESS THAN 100: ICD-10-CM

## 2025-05-06 DIAGNOSIS — M54.50 CHRONIC MIDLINE LOW BACK PAIN WITHOUT SCIATICA: Primary | ICD-10-CM

## 2025-05-06 DIAGNOSIS — R73.03 PREDIABETES: ICD-10-CM

## 2025-05-06 DIAGNOSIS — M51.360 DEGENERATION OF INTERVERTEBRAL DISC OF LUMBAR REGION WITH DISCOGENIC BACK PAIN: ICD-10-CM

## 2025-05-06 DIAGNOSIS — E55.9 VITAMIN D DEFICIENCY: ICD-10-CM

## 2025-05-06 DIAGNOSIS — R60.0 BILATERAL LEG EDEMA: ICD-10-CM

## 2025-05-06 DIAGNOSIS — G47.33 OSA ON CPAP: ICD-10-CM

## 2025-05-06 PROCEDURE — 1036F TOBACCO NON-USER: CPT | Performed by: FAMILY MEDICINE

## 2025-05-06 PROCEDURE — 3017F COLORECTAL CA SCREEN DOC REV: CPT | Performed by: FAMILY MEDICINE

## 2025-05-06 PROCEDURE — G8427 DOCREV CUR MEDS BY ELIG CLIN: HCPCS | Performed by: FAMILY MEDICINE

## 2025-05-06 PROCEDURE — 99214 OFFICE O/P EST MOD 30 MIN: CPT | Performed by: FAMILY MEDICINE

## 2025-05-06 PROCEDURE — G8417 CALC BMI ABV UP PARAM F/U: HCPCS | Performed by: FAMILY MEDICINE

## 2025-05-06 SDOH — ECONOMIC STABILITY: FOOD INSECURITY: WITHIN THE PAST 12 MONTHS, YOU WORRIED THAT YOUR FOOD WOULD RUN OUT BEFORE YOU GOT MONEY TO BUY MORE.: NEVER TRUE

## 2025-05-06 SDOH — ECONOMIC STABILITY: FOOD INSECURITY: WITHIN THE PAST 12 MONTHS, THE FOOD YOU BOUGHT JUST DIDN'T LAST AND YOU DIDN'T HAVE MONEY TO GET MORE.: NEVER TRUE

## 2025-05-06 ASSESSMENT — ENCOUNTER SYMPTOMS
ABDOMINAL DISTENTION: 0
VOMITING: 0
DIARRHEA: 0
NAUSEA: 0
CONSTIPATION: 0
ABDOMINAL PAIN: 0
APNEA: 1
COUGH: 0
SHORTNESS OF BREATH: 0
CHEST TIGHTNESS: 0
WHEEZING: 0

## 2025-05-06 NOTE — ASSESSMENT & PLAN NOTE
Chronic and improved     He uses his CPAP machine for approximately 6 hours per night.  - He was advised to continue using the CPAP machine and to see his pulmonologist annually for documentation and insurance purposes.  - He has an upcoming appointment with his pulmonologist in July.  - Documentation of CPAP usage will be maintained for insurance purposes.  Benefits from CPAP, continue CPAP

## 2025-05-06 NOTE — ASSESSMENT & PLAN NOTE
Chronic and ongoing, not at goal    - His cholesterol levels remain elevated, with LDL cholesterol also high.  - A recheck of his cholesterol levels will be conducted.  - If the cholesterol levels remain high, initiation of statin therapy will be considered to reduce the risk of stroke and heart attack.  - He was informed about the benefits of statins for cardiovascular health.  Low carb, low fat diet, increase fruits and vegetables, and exercise 4-5 times a week 30-40 minutes a day, or walk 1-2 hours per day, or wear a pedometer and get at least 10,000 steps per day.  He declines statin at this time  Orders:    Lipid Panel; Future

## 2025-05-06 NOTE — ASSESSMENT & PLAN NOTE
Chronic and improved  - His hemoglobin A1c levels have improved from 6.1 in 2020 to 5.8 in November.  - He has reduced alcohol consumption but needs to cut down on starchy foods and sweets.  - Dietary modifications were discussed, including reducing the intake of potatoes, pasta, and rice, and avoiding overcooking these foods to maintain fiber content.  - He was advised to use an air fryer for making French fries and to avoid frozen French fries  Lab Results   Component Value Date    LABA1C 5.8 11/12/2024    LABA1C 6.1 04/02/2022         Orders:    Hemoglobin A1C; Future

## 2025-05-06 NOTE — ASSESSMENT & PLAN NOTE
Chronic and improved  He has had prior evaluation by his nephrologist  Will continue to monitor, check labs, avoid NSAIDs and dehydration  Orders:    CBC; Future    Comprehensive Metabolic Panel; Future    Phosphorus; Future    Uric Acid; Future    Magnesium; Future    Vitamin D 25 Hydroxy; Future

## 2025-05-06 NOTE — PROGRESS NOTES
Visit Information    Have you changed or started any medications since your last visit including any over-the-counter medicines, vitamins, or herbal medicines? yes -    Have you stopped taking any of your medications? Is so, why? -  yes -   Are you having any side effects from any of your medications? - no    Have you seen any other physician or provider since your last visit?  no   Have you had any other diagnostic tests since your last visit?  no   Have you been seen in the emergency room and/or had an admission in a hospital since we last saw you?  no   Have you had your routine dental cleaning in the past 6 months?  yes -      Do you have an active MyChart account? If no, what is the barrier?  Yes    Patient Care Team:  Paula Mak MD as PCP - General (Family Medicine)  Paula Mak MD as PCP - Empaneled Provider  David Reyes MD as Consulting Physician (Pulmonology)  Rodri Wood MD as Consulting Physician (Nephrology)  Keegan Livingston MD as Surgeon (General Surgery)    Medical History Review  Past Medical, Family, and Social History reviewed and does contribute to the patient presenting condition    Health Maintenance   Topic Date Due    Hepatitis B vaccine (1 of 3 - 19+ 3-dose series) Never done    Shingles vaccine (1 of 2) Never done    Pneumococcal 50+ years Vaccine (1 of 1 - PCV) Never done    COVID-19 Vaccine (5 - 2024-25 season) 09/01/2024    Flu vaccine (Season Ended) 08/01/2025    GFR test (Diabetes, CKD 3-4, OR last GFR 15-59)  11/11/2025    A1C test (Diabetic or Prediabetic)  11/12/2025    Depression Screen  05/04/2026    Colorectal Cancer Screen  05/26/2027    Lipids  11/23/2029    DTaP/Tdap/Td vaccine (2 - Td or Tdap) 06/09/2034    Hepatitis C screen  Completed    HIV screen  Completed    Hepatitis A vaccine  Aged Out    Hib vaccine  Aged Out    Polio vaccine  Aged Out    Meningococcal (ACWY) vaccine  Aged Out    Meningococcal B vaccine  Aged Out    Diabetes screen

## 2025-05-06 NOTE — PROGRESS NOTES
Joshua Jain (:  1970) is a 55 y.o. male, Established patient, here for evaluation of the following chief complaint(s):   Chronic Kidney Disease, Back Pain (CHRONIC ), and Immunizations (NO TO ALL VACCINES DUE)           Assessment & Plan        Assessment & Plan  Chronic midline low back pain without sciatica  Chronic, intermittent, improved  X-rays lumbar spine 2024 showed chronic bilateral L5 spondylolysis with grade 1 listhesis, and moderate L4-L5 degenerative changes.  He continues to experience back pain despite previous physical therapy, done recently.  - He is not interested in seeing a specialist or receiving injections due to panic attacks associated with shots and prior neurocardiogenic syncope when getting any type of injections or vaccines.  - Over-the-counter Tylenol was recommended for pain management.  - Salonpas patches were suggested, but he prefers to manage without medications  Muscle relaxant did not help much  Continue to move free, weight loss, stretching, repositioning, heating pad       Prediabetes  Chronic and improved  - His hemoglobin A1c levels have improved from 6.1 in  to 5.8 in November.  - He has reduced alcohol consumption but needs to cut down on starchy foods and sweets.  - Dietary modifications were discussed, including reducing the intake of potatoes, pasta, and rice, and avoiding overcooking these foods to maintain fiber content.  - He was advised to use an air fryer for making French fries and to avoid frozen French fries  Lab Results   Component Value Date    LABA1C 5.8 2024    LABA1C 6.1 2022         Orders:    Hemoglobin A1C; Future    Hyperlipidemia with target LDL less than 100  Chronic and ongoing, not at goal    - His cholesterol levels remain elevated, with LDL cholesterol also high.  - A recheck of his cholesterol levels will be conducted.  - If the cholesterol levels remain high, initiation of statin therapy will be

## 2025-05-06 NOTE — ASSESSMENT & PLAN NOTE
Likely worsening due to wear-and-tear nature of the disease  Weight loss, move Free supplement of collagen from over-the-counter advised

## 2025-05-07 NOTE — ASSESSMENT & PLAN NOTE
Chronic and persistent    He experiences leg swelling and wears compression socks consistently.  - Physical examination revealed pitting edema.  - He was advised to avoid salty foods to help manage the swelling.  - Monitoring of leg swelling will continue.  He was supposed to do an echocardiogram years ago for nephrologist but he has never completed it.  He denies chest pain, shortness of breath, and I do not hear a murmur, continue compression stockings for now

## 2025-05-07 NOTE — ASSESSMENT & PLAN NOTE
Chronic   His vitamin D levels were still borderline low at 31 in 2022.  - He takes a daily multivitamin and vitamin D supplement (1000 IU).  - Monitoring of vitamin D levels will continue to avoid excessive intake.  - He was informed about the potential dangers of excessive vitamin D intake.  Orders:    Vitamin D 25 Hydroxy; Future

## 2025-05-07 NOTE — ASSESSMENT & PLAN NOTE
Chronic, intermittent, improved  X-rays lumbar spine 11/23/2024 showed chronic bilateral L5 spondylolysis with grade 1 listhesis, and moderate L4-L5 degenerative changes.  He continues to experience back pain despite previous physical therapy, done recently.  - He is not interested in seeing a specialist or receiving injections due to panic attacks associated with shots and prior neurocardiogenic syncope when getting any type of injections or vaccines.  - Over-the-counter Tylenol was recommended for pain management.  - Salonpas patches were suggested, but he prefers to manage without medications  Muscle relaxant did not help much  Continue to move free, weight loss, stretching, repositioning, heating pad